# Patient Record
Sex: FEMALE | Race: WHITE | NOT HISPANIC OR LATINO | Employment: OTHER | URBAN - METROPOLITAN AREA
[De-identification: names, ages, dates, MRNs, and addresses within clinical notes are randomized per-mention and may not be internally consistent; named-entity substitution may affect disease eponyms.]

---

## 2023-02-21 LAB
ERYTHROCYTE DISTRIBUTION WIDTH (RATIO) BY AUTOMATED COUNT: 14.5 % (ref 11.5–14.5)
ERYTHROCYTE MEAN CORPUSCULAR HEMOGLOBIN CONCENTRATION (G/DL) BY AUTOMATED: 31.1 G/DL (ref 32–36)
ERYTHROCYTE MEAN CORPUSCULAR VOLUME (FL) BY AUTOMATED COUNT: 88 FL (ref 80–100)
ERYTHROCYTES (10*6/UL) IN BLOOD BY AUTOMATED COUNT: 4.24 X10E12/L (ref 4–5.2)
HEMATOCRIT (%) IN BLOOD BY AUTOMATED COUNT: 37.3 % (ref 36–46)
HEMOGLOBIN (G/DL) IN BLOOD: 11.6 G/DL (ref 12–16)
LEUKOCYTES (10*3/UL) IN BLOOD BY AUTOMATED COUNT: 9 X10E9/L (ref 4.4–11.3)
NRBC (PER 100 WBCS) BY AUTOMATED COUNT: 0 /100 WBC (ref 0–0)
PLATELETS (10*3/UL) IN BLOOD AUTOMATED COUNT: 367 X10E9/L (ref 150–450)

## 2023-02-28 ENCOUNTER — DOCUMENTATION (OUTPATIENT)
Dept: PRIMARY CARE | Facility: CLINIC | Age: 68
End: 2023-02-28
Payer: MEDICARE

## 2023-03-13 ENCOUNTER — TELEPHONE (OUTPATIENT)
Dept: PRIMARY CARE | Facility: CLINIC | Age: 68
End: 2023-03-13
Payer: MEDICARE

## 2023-03-13 NOTE — TELEPHONE ENCOUNTER
Pt called she was admitted last week to the [facility] for severe dehydration and released after 2 days.  She was told to get a kidney function test this week.   They did not give her an order for the lab and told her to follow up with her PCP. She has an appoint with you on 3/28/23 but does not think this can wait this long.  She had a CT with contrast and had taken metformin.   Also she wanted me to pass along that her admitting dx was covid but that was not why she went to the hospital. She was dx with covid on 2/25 after foot surgery and had mild sx.  It was the doxycycline that gave her diarrhea and the ASA made her vomit that contributed to her dehydration.

## 2023-03-14 NOTE — TELEPHONE ENCOUNTER
FYI : I called the pt she will be here at 2:45 on Thursday and I notified Leslie to add her to the schedule.

## 2023-03-15 PROBLEM — I10 HYPERTENSION: Status: ACTIVE | Noted: 2023-03-15

## 2023-03-15 PROBLEM — N28.9 ABNORMAL RENAL FUNCTION: Status: ACTIVE | Noted: 2023-03-15

## 2023-03-15 PROBLEM — S82.62XA: Status: ACTIVE | Noted: 2023-03-15

## 2023-03-15 PROBLEM — N81.10 FEMALE BLADDER PROLAPSE: Status: ACTIVE | Noted: 2023-03-15

## 2023-03-15 PROBLEM — L30.9 DERMATITIS: Status: ACTIVE | Noted: 2023-03-15

## 2023-03-15 PROBLEM — L98.9 SKIN ABNORMALITY: Status: ACTIVE | Noted: 2023-03-15

## 2023-03-15 PROBLEM — M21.372 LEFT FOOT DROP: Status: ACTIVE | Noted: 2023-03-15

## 2023-03-15 PROBLEM — E55.9 VITAMIN D DEFICIENCY: Status: ACTIVE | Noted: 2023-03-15

## 2023-03-15 PROBLEM — E11.40 TYPE 2 DIABETES MELLITUS WITH DIABETIC NEUROPATHY (MULTI): Status: ACTIVE | Noted: 2023-03-15

## 2023-03-15 PROBLEM — E78.5 HYPERLIPIDEMIA ASSOCIATED WITH TYPE 2 DIABETES MELLITUS (MULTI): Status: ACTIVE | Noted: 2023-03-15

## 2023-03-15 PROBLEM — R94.8 ABNORMAL RADIONUCLIDE BONE SCAN: Status: ACTIVE | Noted: 2023-03-15

## 2023-03-15 PROBLEM — N81.4 PROLAPSED UTERUS: Status: ACTIVE | Noted: 2023-03-15

## 2023-03-15 PROBLEM — E66.3 OVERWEIGHT WITH BODY MASS INDEX (BMI) OF 29 TO 29.9 IN ADULT: Status: ACTIVE | Noted: 2023-03-15

## 2023-03-15 PROBLEM — L40.9 PSORIASIS: Status: ACTIVE | Noted: 2023-03-15

## 2023-03-15 PROBLEM — G89.29 CHRONIC PAIN: Status: ACTIVE | Noted: 2023-03-15

## 2023-03-15 PROBLEM — E66.3 OVERWEIGHT WITH BODY MASS INDEX (BMI) OF 28 TO 28.9 IN ADULT: Status: ACTIVE | Noted: 2023-03-15

## 2023-03-15 PROBLEM — E11.69 HYPERLIPIDEMIA ASSOCIATED WITH TYPE 2 DIABETES MELLITUS (MULTI): Status: ACTIVE | Noted: 2023-03-15

## 2023-03-15 PROBLEM — R12 HEARTBURN: Status: ACTIVE | Noted: 2023-03-15

## 2023-03-15 PROBLEM — S84.12XA LEFT PERONEAL NERVE INJURY: Status: ACTIVE | Noted: 2023-03-15

## 2023-03-15 PROBLEM — D64.9 ANEMIA: Status: ACTIVE | Noted: 2023-03-15

## 2023-03-15 PROBLEM — R01.1 SYSTOLIC MURMUR: Status: ACTIVE | Noted: 2023-03-15

## 2023-03-15 PROBLEM — R05.9 COUGH: Status: ACTIVE | Noted: 2023-03-15

## 2023-03-15 PROBLEM — K22.70 BARRETT'S ESOPHAGUS: Status: ACTIVE | Noted: 2023-03-15

## 2023-03-15 RX ORDER — GABAPENTIN 100 MG/1
CAPSULE ORAL
COMMUNITY
Start: 2022-03-08 | End: 2023-03-16 | Stop reason: ALTCHOICE

## 2023-03-15 RX ORDER — CALCIPOTRIENE 0.05 MG/ML
SOLUTION TOPICAL
COMMUNITY
Start: 2020-11-09

## 2023-03-15 RX ORDER — VALSARTAN 320 MG/1
1 TABLET ORAL
COMMUNITY
Start: 2022-01-05 | End: 2024-02-06 | Stop reason: SDUPTHER

## 2023-03-15 RX ORDER — FLUOCINOLONE ACETONIDE 0.11 MG/ML
OIL TOPICAL
COMMUNITY
Start: 2022-08-12

## 2023-03-15 RX ORDER — HYDROCHLOROTHIAZIDE 25 MG/1
1 TABLET ORAL
COMMUNITY
Start: 2020-01-28 | End: 2023-03-28 | Stop reason: DRUGHIGH

## 2023-03-15 RX ORDER — INSULIN ASPART 100 [IU]/ML
45 INJECTION, SOLUTION INTRAVENOUS; SUBCUTANEOUS
COMMUNITY
Start: 2016-06-13

## 2023-03-15 RX ORDER — CLOBETASOL PROPIONATE 0.5 MG/ML
LOTION TOPICAL
COMMUNITY
Start: 2014-01-29

## 2023-03-15 RX ORDER — SITAGLIPTIN AND METFORMIN HYDROCHLORIDE 1000; 50 MG/1; MG/1
1 TABLET, FILM COATED ORAL
COMMUNITY
Start: 2019-08-07 | End: 2024-05-22 | Stop reason: SDUPTHER

## 2023-03-15 RX ORDER — ATORVASTATIN CALCIUM 20 MG/1
1 TABLET, FILM COATED ORAL
COMMUNITY
Start: 2019-04-25 | End: 2024-02-06 | Stop reason: SDUPTHER

## 2023-03-15 RX ORDER — BLOOD SUGAR DIAGNOSTIC
STRIP MISCELLANEOUS
COMMUNITY
Start: 2022-01-13 | End: 2023-10-09 | Stop reason: SDUPTHER

## 2023-03-15 RX ORDER — MULTIVITAMIN
1 TABLET ORAL DAILY
COMMUNITY
Start: 2021-02-09

## 2023-03-15 RX ORDER — INSULIN DEGLUDEC 100 U/ML
54 INJECTION, SOLUTION SUBCUTANEOUS NIGHTLY
COMMUNITY
Start: 2019-09-10

## 2023-03-15 RX ORDER — FLUOCINONIDE 0.5 MG/G
CREAM TOPICAL
COMMUNITY
Start: 2022-01-13 | End: 2023-10-09 | Stop reason: ALTCHOICE

## 2023-03-15 RX ORDER — ACETAMINOPHEN 160 MG/5ML
200 SUSPENSION, ORAL (FINAL DOSE FORM) ORAL
COMMUNITY

## 2023-03-15 RX ORDER — ASPIRIN 81 MG/1
1 TABLET ORAL
COMMUNITY
End: 2023-06-05 | Stop reason: SDUPTHER

## 2023-03-15 RX ORDER — PANTOPRAZOLE SODIUM 20 MG/1
40 TABLET, DELAYED RELEASE ORAL
COMMUNITY
Start: 2016-08-26 | End: 2023-08-07 | Stop reason: SDUPTHER

## 2023-03-15 RX ORDER — METOPROLOL SUCCINATE 50 MG/1
1 TABLET, EXTENDED RELEASE ORAL
COMMUNITY
Start: 2017-08-28 | End: 2023-06-05 | Stop reason: SDUPTHER

## 2023-03-15 RX ORDER — PEN NEEDLE, DIABETIC 31 GX5/16"
NEEDLE, DISPOSABLE MISCELLANEOUS
COMMUNITY
Start: 2023-01-12

## 2023-03-15 RX ORDER — VITAMIN B COMPLEX
1 CAPSULE ORAL
COMMUNITY
End: 2024-05-22 | Stop reason: WASHOUT

## 2023-03-16 ENCOUNTER — OFFICE VISIT (OUTPATIENT)
Dept: PRIMARY CARE | Facility: CLINIC | Age: 68
End: 2023-03-16
Payer: MEDICARE

## 2023-03-16 ENCOUNTER — LAB (OUTPATIENT)
Dept: LAB | Facility: LAB | Age: 68
End: 2023-03-16
Payer: MEDICARE

## 2023-03-16 VITALS — DIASTOLIC BLOOD PRESSURE: 62 MMHG | HEART RATE: 72 BPM | SYSTOLIC BLOOD PRESSURE: 162 MMHG

## 2023-03-16 DIAGNOSIS — Z09 HOSPITAL DISCHARGE FOLLOW-UP: Primary | ICD-10-CM

## 2023-03-16 DIAGNOSIS — D64.9 ANEMIA, UNSPECIFIED TYPE: ICD-10-CM

## 2023-03-16 DIAGNOSIS — R19.7 NAUSEA VOMITING AND DIARRHEA: ICD-10-CM

## 2023-03-16 DIAGNOSIS — U07.1 COVID-19 VIRUS INFECTION: ICD-10-CM

## 2023-03-16 DIAGNOSIS — R23.8 REDNESS AND SWELLING OF UPPER ARM: ICD-10-CM

## 2023-03-16 DIAGNOSIS — N17.9 ACUTE RENAL FAILURE, UNSPECIFIED ACUTE RENAL FAILURE TYPE (CMS-HCC): ICD-10-CM

## 2023-03-16 DIAGNOSIS — E86.0 DEHYDRATION: ICD-10-CM

## 2023-03-16 DIAGNOSIS — R11.2 NAUSEA VOMITING AND DIARRHEA: ICD-10-CM

## 2023-03-16 DIAGNOSIS — R91.8 BILATERAL PULMONARY INFILTRATES ON CHEST X-RAY: ICD-10-CM

## 2023-03-16 DIAGNOSIS — R79.89 ELEVATED D-DIMER: ICD-10-CM

## 2023-03-16 DIAGNOSIS — M79.89 REDNESS AND SWELLING OF UPPER ARM: ICD-10-CM

## 2023-03-16 DIAGNOSIS — L03.114 CELLULITIS OF LEFT ARM: ICD-10-CM

## 2023-03-16 DIAGNOSIS — J12.9 VIRAL PNEUMONITIS: ICD-10-CM

## 2023-03-16 PROCEDURE — 85025 COMPLETE CBC W/AUTO DIFF WBC: CPT

## 2023-03-16 PROCEDURE — 99496 TRANSJ CARE MGMT HIGH F2F 7D: CPT | Performed by: INTERNAL MEDICINE

## 2023-03-16 PROCEDURE — 3077F SYST BP >= 140 MM HG: CPT | Performed by: INTERNAL MEDICINE

## 2023-03-16 PROCEDURE — 3078F DIAST BP <80 MM HG: CPT | Performed by: INTERNAL MEDICINE

## 2023-03-16 PROCEDURE — 1160F RVW MEDS BY RX/DR IN RCRD: CPT | Performed by: INTERNAL MEDICINE

## 2023-03-16 PROCEDURE — 1036F TOBACCO NON-USER: CPT | Performed by: INTERNAL MEDICINE

## 2023-03-16 PROCEDURE — 4010F ACE/ARB THERAPY RXD/TAKEN: CPT | Performed by: INTERNAL MEDICINE

## 2023-03-16 PROCEDURE — 1159F MED LIST DOCD IN RCRD: CPT | Performed by: INTERNAL MEDICINE

## 2023-03-16 PROCEDURE — 36415 COLL VENOUS BLD VENIPUNCTURE: CPT

## 2023-03-16 PROCEDURE — 80053 COMPREHEN METABOLIC PANEL: CPT

## 2023-03-16 RX ORDER — ASPIRIN 81 MG/1
81 TABLET ORAL ONCE
Status: DISCONTINUED | OUTPATIENT
Start: 2023-03-16 | End: 2023-03-16

## 2023-03-16 RX ORDER — CEPHALEXIN 500 MG/1
CAPSULE ORAL
Qty: 21 CAPSULE | Refills: 0 | Status: SHIPPED | OUTPATIENT
Start: 2023-03-16 | End: 2023-03-28 | Stop reason: ALTCHOICE

## 2023-03-16 RX ORDER — LACTOBACILLUS RHAMNOSUS GG 10B CELL
1 CAPSULE ORAL DAILY
Qty: 15 CAPSULE | Refills: 0
Start: 2023-03-16 | End: 2024-05-22 | Stop reason: ALTCHOICE

## 2023-03-16 RX ORDER — ASPIRIN 81 MG/1
TABLET ORAL
Qty: 30 TABLET | Refills: 11
Start: 2023-03-16 | End: 2023-06-05 | Stop reason: SDUPTHER

## 2023-03-16 ASSESSMENT — COLUMBIA-SUICIDE SEVERITY RATING SCALE - C-SSRS
2. HAVE YOU ACTUALLY HAD ANY THOUGHTS OF KILLING YOURSELF?: NO
1. IN THE PAST MONTH, HAVE YOU WISHED YOU WERE DEAD OR WISHED YOU COULD GO TO SLEEP AND NOT WAKE UP?: NO
6. HAVE YOU EVER DONE ANYTHING, STARTED TO DO ANYTHING, OR PREPARED TO DO ANYTHING TO END YOUR LIFE?: NO

## 2023-03-16 ASSESSMENT — PAIN SCALES - GENERAL: PAINLEVEL: 0-NO PAIN

## 2023-03-16 ASSESSMENT — ENCOUNTER SYMPTOMS
DEPRESSION: 0
OCCASIONAL FEELINGS OF UNSTEADINESS: 0
LOSS OF SENSATION IN FEET: 1

## 2023-03-16 ASSESSMENT — PATIENT HEALTH QUESTIONNAIRE - PHQ9
1. LITTLE INTEREST OR PLEASURE IN DOING THINGS: NOT AT ALL
SUM OF ALL RESPONSES TO PHQ9 QUESTIONS 1 AND 2: 0
2. FEELING DOWN, DEPRESSED OR HOPELESS: NOT AT ALL

## 2023-03-16 NOTE — PROGRESS NOTES
Patient: Juanis Arboleda  : 1955  PCP: Megan Domingo MD  MRN: [id number]  Program: No linked episodes     Juanis Arboleda is a 67 y.o. female presenting today for follow-up after being discharged from the hospital 6 days ago. The main problem requiring admission was nausea vomiting diarrhea dehydration and acute kidney injury her creatinine was 1.62 when she arrived at the hospital.  She had refused to go to the hospital, she is accompanied by her daughter who is a nurse today and said she finally insisted when she was slumped over on the toilet and not acting right and she called 911.  She was treated with IV fluids.  She had a very high D-dimer over 4000 per the family, she had an anemia, preop her hemoglobin was 11.4 and that anemia is being worked up and she is to follow-up with GI, this time with the dehydration her hemoglobin was 10.4 and 10.2 at discharge.  They did a CT PE protocol despite the elevated creatinine, and this did not show a PE she had ultrasounds of her legs that did not show a PE, but she did have bilateral infiltrates groundglass opacities consistent with a viral pneumonia and her COVID-positive status.  She felt that the nausea vomiting diarrhea was actually from the doxycycline and she stopped it at the 6-day of treatment.  That was given to her for her ankle surgery that she had on .  That surgery went well, left lower leg.  She is recovering nicely from that.    Her creatinine at discharge was 1.0.  She has held her Janumet valsartan and hydrochlorothiazide per instructions from the hospital.  She also had been given, by podiatry, and increase in her usual 81 mg daily aspirin dose, 2 325 mg twice daily and that was stopped as well because of the nausea vomiting diarrhea and anemia and she is back on her 81 mg once per day enteric-coated.    She has no complaints today she feels good.  She then amended that and said she did have some nasal congestion postnasal  "drainage which caused a little bit of a cough but she did not consider those major symptoms and she feels well.  See notes below.. The discharge summary and/or Transitional Care Management documentation was reviewed. Medication reconciliation was performed as indicated via the \"Mian as Reviewed\" timestamp.     Covid positive but this was day after surgery.  Had a bad valente.  was pos few days before the surgery.  Neg the day before and pos the day after.  Put on odxy 100mg bid twice per day.  Day 5 or 6 gi upsed. And also 325mg asa twice per day.  N/v/d no blood.  No abd pain.  Was not eating.  Sugars were ok. Not low.  Dtr convinced her to go to the hospital. She would not go.  She was out of it and dtr called the squad slumped over ont hetoilet.    Since home no nvd.   Taking bid pantoprazoles instad of one  Appetite back when home. Never lost taste or smell but  did.  Now has a cough and had a little plhegm before, clear.blowing nose a lot  No sob ever  No wheezing  No cp no palpitations.  No leg swelling.  Ct chest infiltrate. Bialteral more prominin on the lef  Pcn allergy and     Yesterday noted painful swelling left arm above and below elbow --did have iv in atnecubital area and she was bending a lot.they never took it out.  It was not like this when she left hospital  No fever chills.      She is waiting to take the valsartan hydrochlorothiazide and janumet until we tell ehr.  Juanis L Robles was contacted by Transitional Care Management services two days after her discharge. This encounter and supporting documentation was reviewed.    The complexity of medical decision making for this patient's transitional care is high.    Review of Systems  See above  Family History   Problem Relation Name Age of Onset    Hypertension Mother      Diabetes type II Mother      Other (cholecystectomy) Mother      Lung disease Mother          nonsmoking COPD and asthma had second hand smoke exposure    Diabetes " type II Father      Diabetes type II Sister      Other (cholecystectomy) Sister      Diabetes type II Brother      Other (cholecystectomy) Daughter      Other (bicuspid aortic valve) Grandson      Other (cardiac disorder) Other      Graves' disease Other      Hypertension Other      Diabetes type II Other     Patient looks well and is in no obvious distress.  HEENT:   Normocephalic, no facial asymmetry  Eyes: Sclera and conjunctiva are clear.  Neck: No adenopathy cervical (Ant/post/lat), no Supraclavicular nodes.   Thyroid normal.   Carotid pulses normal, no carotid bruits.  Lungs : RR normal. Clear to auscultation anterior, posterior and lateral. No rales, wheezes rhonchi or rubs. Good air exchange.  Heart: RRR. 1-2/6 systolic Murmur ursb , No gallop, click or rub.  Abdomen: Bowel sounds normal, No bruits. No pulsatile mass. No hepatosplenomegaly, masses or tenderness. Soft, no guarding.  Vascular:  Posterior tibialis and dorsalis pedis pulses within normal limits bilaterally.   Extremities: no upper extremity edema. No lower extremity edema.   Musculoskeletal: no synovitis of joints seen. No new deformity.  Neuro: CN 2-12 intact. Alert, appropriate.   Ambulates independently.  No gross motor deficit.   No tremors.  Psych: normal mood and affect.  Skin: No rash, bruising petechiae or jaundice.    Lower leg is in a boot and she is here with a wheelchair.  Negative Homans no calf tenderness on palpation and no swelling of the upper part of the lower leg visible on the left and any part of the right lower leg.  Her left arm though shows erythema warmth and swelling starting just inferior to the elbow and extending up about 4 to 5 inches proximal to the elbow it swollen over the medial olecranon in the soft tissues and mildly tender.  She also has a dilated vein extending from just below the elbow and acutely to two thirds of the way up the left upper arm.  She did have an antecubital IV when in the hospital.  Rule out  "cellulitis, rule out DVT of the left upper extremity.  Her risk for this is she had an IV, she is diabetic, she is postop from ankle surgery and she had COVID with elevated D-dimer.  We will get a stat ultrasound of this.  We will recheck her labs for the others.    For her blood pressure, she will restart her valsartan but hold her hydrochlorothiazide.  She will continue to hold Janumet.    Diabetes her sugars are doing fairly well she just checked it while she was here and it was 119.  She has not had any low sugars.  She said she could not give me her data from her Dexcom because she did not have her phone to provide it for me but she thought they were \"okay\".  She has not had any low sugars.  We will do a close follow-up and she will come back in 1 to 2 weeks      No data recorded  Labs reviewed on ccf dr connect/  Imaging reviewed here.  .laABNORMAL) CBC (03/09/2023 6:09 AM EST)  Lab Results - (ABNORMAL) CBC (03/09/2023 6:09 AM EST)  Component Value Ref Range Test Method Analysis Time Performed At Pathologist Signature   WBC 5.33 3.70 - 11.00 k/uL   03/09/2023 6:28 AM EST [facility]     RBC 3.82 (L) 3.90 - 5.20 m/uL   03/09/2023 6:28 AM EST [facility]     Hemoglobin 10.2 (L) 11.5 - 15.5 g/dL   03/09/2023 6:28 AM EST [facility]     Hematocrit 31.8 (L) 36.0 - 46.0 %   03/09/2023 6:28 AM EST [facility]     MCV 83.2 80.0 - 100.0 fL   03/09/2023 6:28 AM EST [facility]     MCH 26.7 26.0 - 34.0 pg   03/09/2023 6:28 AM EST [facility]     MCHC 32.1 30.5 - 36.0 g/dL   03/09/2023 6:28 AM EST [facility]     RDW-CV 14.6 11.5 - 15.0 %   03/09/2023 6:28 AM EST [facility]     Platelet Count 441 (H) 150 - 400 k/uL   03/09/2023 6:28 AM EST [facility]     MPV 9.6 9.0 - 12.7 fL   03/09/2023 6:28 AM EST [facility]     Absolute nRBC <0.01 <0.01 k/uL   03/09/2023 6:28 AM EST [facility]      BUN 12 7 - 21 mg/dL   03/09/2023 6:44 AM [facility]     Creatinine " 1.00 (H) 0.58 - 0.96 mg/dL   03/09/2023 6:44 AM EST [facility]     Sodium 136 136 - 144 mmol/L   03/09/2023 6:44 AM EST [facility]     Potassium 5.3 (H) 3.7 - 5.1 mmol/L   03/09/2023 6:44 AM EST [facility]     Chloride 104 97 - 105 mmol/L   03/09/2023 6:44 AM EST [facility]     CO2 25 22 - 30 mmol/L   03/09/2023 6:44 AM EST [facility]     Anion Gap 7 (L) 9 - 18 mmol/L   03/09/2023 6:44 AM EST [facility]     Calcium, Total 8.8 8.5 - 10.2 mg/dL   03/09/2023 6:44 AM EST [facility]     Estimated Glomerular Filtration Rate 61 >=60 mL/min/1.73m²   03/09/2023 6:44 AM EST [facility]    ABNORMAL) URINE CULTURE (03/08/2023 1:40 PM EST)  Lab Results - (ABNORMAL) URINE CULTURE (03/08/2023 1:40 PM EST)  Component Value Ref Range Test Method Analysis Time Performed At Pathologist [facility]   Culture, Urine 10,000 -<50,000 CFU/ml Enterococcus faecalis (A)   MINIMUM INHIBITORY CONCENTRATION(VITEK)  03/10/2023 8:41 AM EST [facility]     Comment: Cephalosporins, clindamycin, and TMP-SMX are not effective for the treatment of enterococcal infections.     ABNORMAL) D-DIMER (03/07/2023 11:01 AM EST)  Lab Results - (ABNORMAL) D-DIMER (03/07/2023 11:01 AM EST)  Component Value Ref Range Test Method Analysis Time Performed At Pathologist [facility]   D Dimer 4,190 (H) <500 ng/mL FEU   03/07/2023 11:45 AM EST [facility]     D Dimer Age-related Cutoff 690 ng/mL FEU   03/07/2023 11:45 AM EST [facility]       Protein, Total 7.1 6.3 - 8.0 g/dL   03/07/2023 11:49 AM EST [facility]     Albumin 3.9 3.9 - 4.9 g/dL   03/07/2023 11:49 AM EST [facility]     Calcium, Total 9.5 8.5 - 10.2 mg/dL   03/07/2023 11:49 AM [facility]     Bilirubin, Total 1.0 0.2 - 1.3 mg/dL   03/07/2023 11:49 AM [facility]     Alkaline Phosphatase 76 34 - 123 U/L   03/07/2023 11:49 AM [facility]     AST 32 13 - 35 U/L   03/07/2023 11:49 AM [facility]     ALT 30 7 - 38 U/L   03/07/2023 11:49 AM EST [facility]     Glucose 232 (H) 74 - 99 mg/dL   03/07/2023 11:49 AM EST [facility]     Comment:  The American Diabetes Association (ADA) provides guidance for cutoff values for fasting glucose and random glucose. The ADA defines fasting as no caloric intake for at least 8 hours. Fasting plasma glucose results between 100 to 125 mg/dL indicate increased risk for diabetes (prediabetes).  Fasting plasma glucose results greater than or equal to 126 mg/dL meet the criteria for diagnosis of diabetes. In the absence of unequivocal hyperglycemia, results should be confirmed by repeat testing. In a patient with classic symptoms of hyperglycemia or hyperglycemic crisis, random plasma glucose results greater than or equal to 200 mg/dL meet the criteria for diagnosis of diabetes.  Reference: Standards of Medical Care in Diabetes 2016, American Diabetes Association. Diabetes Care. 2016.39(Suppl 1).     BUN 35 (H) 7 - 21 mg/dL   03/07/2023 11:49 AM [facility]     Creatinine 1.62 (H) 0.58 - 0.96 mg/dL   03/07/2023 11:49 AM [facility]     Sodium 131 (L) 136 - 144 mmol/L   03/07/2023 11:49 AM EST [facility]     Potassium 5.1 3.7 - 5.1 mmol/L   03/07/2023 11:49 AM [facility]     Chloride 94 (L) 97 - 105 mmol/L   03/07/2023 11:49 AM EST [facility]     CO2 22 22 - 30 mmol/L   03/07/2023 11:49 AM EST [facility]     Anion Gap 15 9 - 18 mmol/L   03/07/2023 11:49 AM EST [facility]    Ser rate 41 and crp 2.0    Follow up 3-28.

## 2023-03-16 NOTE — PATIENT INSTRUCTIONS
Blood test today.  Ultrasound veins left arm today or in am to be sure no clot.  Cephalexin 500 mg three times per day about 8 hours apart, take with food, for 7 days.  Probiotic or greek yogurt daily. (Align or culturelle).  Aspirin 2 tabs of 81mg twice per day with food for the time being.  Restart valsartan but not hydrochlorothiazide and not janumet.  Use insulin to control your sugars for now.  See me in 10-14 days.         Ways to Help Prevent Falls at Home    Quick Tips   ? Ask for help if you need it. Most people want to help!   ? Get up slowly after sitting or laying down   ? Wear a medical alert device or keep cell phone in your pocket   ? Use night lights, especially areas near a bathroom   ? Keep the items you use often within reach on a small stool or end table   ? Use an assistive device such as walker or cane, as directed by provider/physical therapy   ? Use a non-slip mat and grab bars in your bathroom. Look for home health sections for best options     Other Areas to Focus On   ? Exercise and nutrition: Regular exercise or taking a falls prevention class are great ways improve strength and balance. Don’t forget to stay hydrated and bring a snack!   ? Medicine side effects: Some medicines can make you sleepy or dizzy, which could cause a fall. Ask your healthcare provider about the side effects your medicines could cause. Be sure to let them know if you take any vitamins or supplements as well.   ? Tripping hazards: Remove items you could trip on, such as loose mats, rugs, cords, and clutter. Wear closed toe shoes with rubber soles.   ? Health and wellness: Get regular checkups with your healthcare provider, plus routine vision and hearing screenings. Talk with your healthcare provider about:   o Your medicines and the possible side effects - bring them in a bag if that is easier!   o Problems with balance or feeling dizzy   o Ways to promote bone health, such as Vitamin D and calcium supplements   o  Questions or concerns about falling     *Ask your healthcare team if you have questions     ©[employer], 2022

## 2023-03-17 ENCOUNTER — TELEPHONE (OUTPATIENT)
Dept: PRIMARY CARE | Facility: CLINIC | Age: 68
End: 2023-03-17
Payer: MEDICARE

## 2023-03-17 LAB
ALANINE AMINOTRANSFERASE (SGPT) (U/L) IN SER/PLAS: 32 U/L (ref 7–45)
ALBUMIN (G/DL) IN SER/PLAS: 3.9 G/DL (ref 3.4–5)
ALKALINE PHOSPHATASE (U/L) IN SER/PLAS: 67 U/L (ref 33–136)
ANION GAP IN SER/PLAS: 14 MMOL/L (ref 10–20)
ASPARTATE AMINOTRANSFERASE (SGOT) (U/L) IN SER/PLAS: 26 U/L (ref 9–39)
BASOPHILS (10*3/UL) IN BLOOD BY AUTOMATED COUNT: 0.05 X10E9/L (ref 0–0.1)
BASOPHILS/100 LEUKOCYTES IN BLOOD BY AUTOMATED COUNT: 0.5 % (ref 0–2)
BILIRUBIN TOTAL (MG/DL) IN SER/PLAS: 0.8 MG/DL (ref 0–1.2)
CALCIUM (MG/DL) IN SER/PLAS: 10.5 MG/DL (ref 8.6–10.6)
CARBON DIOXIDE, TOTAL (MMOL/L) IN SER/PLAS: 28 MMOL/L (ref 21–32)
CHLORIDE (MMOL/L) IN SER/PLAS: 104 MMOL/L (ref 98–107)
CREATININE (MG/DL) IN SER/PLAS: 0.98 MG/DL (ref 0.5–1.05)
EOSINOPHILS (10*3/UL) IN BLOOD BY AUTOMATED COUNT: 0.1 X10E9/L (ref 0–0.7)
EOSINOPHILS/100 LEUKOCYTES IN BLOOD BY AUTOMATED COUNT: 1 % (ref 0–6)
ERYTHROCYTE DISTRIBUTION WIDTH (RATIO) BY AUTOMATED COUNT: 14.4 % (ref 11.5–14.5)
ERYTHROCYTE MEAN CORPUSCULAR HEMOGLOBIN CONCENTRATION (G/DL) BY AUTOMATED: 31.7 G/DL (ref 32–36)
ERYTHROCYTE MEAN CORPUSCULAR VOLUME (FL) BY AUTOMATED COUNT: 86 FL (ref 80–100)
ERYTHROCYTES (10*6/UL) IN BLOOD BY AUTOMATED COUNT: 4.15 X10E12/L (ref 4–5.2)
GFR FEMALE: 62 ML/MIN/1.73M2
GLUCOSE (MG/DL) IN SER/PLAS: 99 MG/DL (ref 74–99)
HEMATOCRIT (%) IN BLOOD BY AUTOMATED COUNT: 35.7 % (ref 36–46)
HEMOGLOBIN (G/DL) IN BLOOD: 11.3 G/DL (ref 12–16)
IMMATURE GRANULOCYTES/100 LEUKOCYTES IN BLOOD BY AUTOMATED COUNT: 0.6 % (ref 0–0.9)
LEUKOCYTES (10*3/UL) IN BLOOD BY AUTOMATED COUNT: 10.3 X10E9/L (ref 4.4–11.3)
LYMPHOCYTES (10*3/UL) IN BLOOD BY AUTOMATED COUNT: 3.09 X10E9/L (ref 1.2–4.8)
LYMPHOCYTES/100 LEUKOCYTES IN BLOOD BY AUTOMATED COUNT: 30.1 % (ref 13–44)
MONOCYTES (10*3/UL) IN BLOOD BY AUTOMATED COUNT: 0.8 X10E9/L (ref 0.1–1)
MONOCYTES/100 LEUKOCYTES IN BLOOD BY AUTOMATED COUNT: 7.8 % (ref 2–10)
NEUTROPHILS (10*3/UL) IN BLOOD BY AUTOMATED COUNT: 6.16 X10E9/L (ref 1.2–7.7)
NEUTROPHILS/100 LEUKOCYTES IN BLOOD BY AUTOMATED COUNT: 60 % (ref 40–80)
NRBC (PER 100 WBCS) BY AUTOMATED COUNT: 0 /100 WBC (ref 0–0)
PLATELETS (10*3/UL) IN BLOOD AUTOMATED COUNT: 576 X10E9/L (ref 150–450)
POTASSIUM (MMOL/L) IN SER/PLAS: 4.9 MMOL/L (ref 3.5–5.3)
PROTEIN TOTAL: 6.8 G/DL (ref 6.4–8.2)
SODIUM (MMOL/L) IN SER/PLAS: 141 MMOL/L (ref 136–145)
UREA NITROGEN (MG/DL) IN SER/PLAS: 15 MG/DL (ref 6–23)

## 2023-03-17 NOTE — RESULT ENCOUNTER NOTE
Please call the patient regarding her abnormal result.for argelia or [place]:  Please call Juanis today about her left arm ultrasound results.  Good news that she does not have a deep vein thrombosis, but she does have a superficial phlebitis.  I recommend that she continue the 2 baby aspirin twice per day for 2 weeks.  She should use warm compresses on this area about 20 to 30 minutes 3 times a day.  She should take the antibiotic that we prescribed.  Her kidney function is back to normal.  Her anemia is mild and similar to what it was before she was in the hospital.  She should restart the valsartan as we discussed.  I would still like her to hold off on the hydrochlorothiazide until I see her in the office.  She should call if her arm above the elbow becomes swollen or more swollen.

## 2023-03-20 RX ORDER — GLUCOSAMINE HCL 500 MG
TABLET ORAL
COMMUNITY

## 2023-03-20 RX ORDER — INSULIN ASPART 100 [IU]/ML
14 INJECTION, SOLUTION INTRAVENOUS; SUBCUTANEOUS
COMMUNITY
End: 2023-10-09 | Stop reason: ALTCHOICE

## 2023-03-27 NOTE — PROGRESS NOTES
Subjective   Patient ID: Juanis Arboleda is a 67 y.o. female who presents for Follow-up.  LAST VISIT was hospital follow upmarch 2023  Plan was:Blood test today.  Ultrasound veins left arm today or in am to be sure no clot.  Cephalexin 500 mg three times per day about 8 hours apart, take with food, for 7 days.  Probiotic or greek yogurt daily. (Align or culturelle).  Aspirin 2 tabs of 81mg twice per day with food for the time being.  Restart valsartan but not hydrochlorothiazide and not janumet.  Use insulin to control your sugars for now.  See me in 10-14 days.   Last regular visit was  1/30/23 and plan was.  Patient Discussion/Summary  MEDICATIONS:   no change at this time, ok for b complex you are taking.  if iron is low we may have you start taking it but not until after stool studies are done  INSTRUCTIONS:  You are encouraged to drink 48-64 oz of water per day. 1 or 2 cups of herbal tea could be counted toward the water intake.    A diet that is low in sugars, refined grains and processed foods is recommended.  Exercise at least 30 minutes per day is also recommended.  follow dr sanchez instructions for insulin and holding aspirin with your upcoming surgery.  TESTING:stool test for FIT and stool for occult blood, blood test to check iron today.  blood test to check on anemia in 3 weeks, couple days before your foot surgery with dr javier..    Referrals: make an appt with Dr Sonny CORREA about anemia but also you are due to see him for past history barretts.  FOLLOW UP WITH DR. MONROE:  Next visit:3-4 months as long as you can get in to see Dr Dennis before then.  Provider Impressions  anemia, see hpi, ck stool for blood, refer back to her gi as due anyways, ck iron folate(b12 is ok). is borderline. will cici cbc few days before her foot surgery to be sure not dropping. has no c/o  Type 2 DM with hyperlipidemia-labs ok, refilled statin  HTN well controlled at home, and a little better here than usual  Systolic  murmur d/w her-stable exam, echo did not show any abnormality 2021-i was first to tell her about urmur  Abnormal renal function--see above-creat 1.09, avoid nsaids, keep bp under 130/80-it is at home, hydrate.  Vitamin D deficiencylevel ok and prior inc calcium resolved on last 2 tests.  END INFO FROM January visit  HPI  Scribe Transcription:  She reports feeling better today. She states she is taking 4 baby dose aspirin for 2 weeks as well as Keflex. She also held her Janumet due to her kidney function. She is better but not quite back to normal with her blood tests. Her Cr is 0.98. Her last visit was a hospital f/u and her cr was 1.09 and then on March 16 it was 0.98. She still has anemia as well.     She denies abdominal issues, breathing issues, and chest pain.     She states she sees Dr. Ryan again on 5/25. We discussed having her call the office with updates on her bp after she checks it at home tomorrow.     Cardiac: No CP, palpitations, increased jacobo  Resp: No sob, wheezing, cough  Extremities: No leg or ankle swelling, no claudication  Neuro-No dizziness, syncope, blurred vision. No new headaches.   She reports eating villafana and corned beef this morning and lunch before getting her bp done today.     Diabetes:Type II:Is taking medications regularly, denies side effects.  Denies hypoglycemia, polyuria, polydipsia.  No new numbness or paresthesias of extremities.No syncope or dizziness.No blurred vision.  Lab Results   Component Value Date    HGBA1C 8.0 05/06/2020     Lab Results   Component Value Date    CREATININE 0.98 03/16/2023         Lab Results   Component Value Date    GLUCOSE 99 03/16/2023    CALCIUM 10.5 03/16/2023     03/16/2023    K 4.9 03/16/2023    CO2 28 03/16/2023     03/16/2023    BUN 15 03/16/2023    CREATININE 0.98 03/16/2023      No results found for this or any previous visit (from the past 4464 hour(s)).       Diabetic Medications:   Insulin and janumet is on hold      She  denies any hypoglycemia and her sugars have been running in the 300s, likely due to stopping Janumet.     Her cough has resolved and her acute renal failure has resolved. She no longer has redness and swelling in the upper arm but it is  at her medial left elbow. She has no signs of infection and has finished her antibiotics. She did not have elevated plts on leaving the hospital, but does now. She did have an elevated d dimer on discharge. CT scan showed more disease on the left side as well and I think an xray is okay.     Review of Systems    Objective   Lab Results   Component Value Date    WBC 10.3 03/16/2023    HGB 11.3 (L) 03/16/2023    HCT 35.7 (L) 03/16/2023     (H) 03/16/2023    CHOL 171 09/20/2022    TRIG 219 (H) 09/20/2022    HDL 34.6 (A) 09/20/2022    ALT 32 03/16/2023    AST 26 03/16/2023     03/16/2023    K 4.9 03/16/2023     03/16/2023    CREATININE 0.98 03/16/2023    BUN 15 03/16/2023    CO2 28 03/16/2023    TSH 2.43 01/25/2023    HGBA1C 8.0 05/06/2020      Latest Reference Range & Units 01/25/23 13:43 01/30/23 10:46 03/16/23 16:25   GLUCOSE 74 - 99 mg/dL 133 (H)  99   SODIUM 136 - 145 mmol/L 141  141   POTASSIUM 3.5 - 5.3 mmol/L 4.8  4.9   CHLORIDE 98 - 107 mmol/L 102  104   Bicarbonate 21 - 32 mmol/L 29  28   Anion Gap 10 - 20 mmol/L 15  14   Blood Urea Nitrogen 6 - 23 mg/dL 22  15   Creatinine 0.50 - 1.05 mg/dL 1.09 (H)  0.98   Calcium 8.6 - 10.6 mg/dL 10.2  10.5   Albumin 3.4 - 5.0 g/dL 4.4  3.9   Alkaline Phosphatase 33 - 136 U/L 66  67   ALT 7 - 45 U/L 30  32   AST 9 - 39 U/L 24  26   Bilirubin Total 0.0 - 1.2 mg/dL 0.8  0.8   FERRITIN 8 - 150 ug/L  30    FOLATE >5.0 ng/mL  >24.0    Total Protein 6.4 - 8.2 g/dL 6.9  6.8   IRON 35 - 150 ug/dL  62    TIBC 240 - 445 ug/dL  >512 !    % Saturation 25 - 45 %  NOT CALC.    Vitamin B12 211 - 911 pg/mL 419     Thyroid Stimulating Hormone 0.44 - 3.98 mIU/L 2.43     Vitamin D, 25-Hydroxy, Total ng/mL 37     Vit D,  "1,25-Dihydroxy 19.9 - 79.3 pg/mL 39.2     (H): Data is abnormally high  !: Data is abnormal   Latest Reference Range & Units 01/25/23 13:43 02/20/23 14:12 03/16/23 16:25   WBC 4.4 - 11.3 x10E9/L 8.4 9.0 10.3   RBC 4.00 - 5.20 x10E12/L 4.48 4.24 4.15   HEMOGLOBIN 12.0 - 16.0 g/dL 11.9 (L) 11.6 (L) 11.3 (L)   HEMATOCRIT 36.0 - 46.0 % 39.0 37.3 35.7 (L)   MCV 80 - 100 fL 87 88 86   MCHC 32.0 - 36.0 g/dL 30.5 (L) 31.1 (L) 31.7 (L)   Platelets 150 - 450 x10E9/L 380 367 576 (H)   Neutrophils % 40.0 - 80.0 %   60.0   Immature Granulocytes %, Automated 0.0 - 0.9 %   0.6   Lymphocytes % 13.0 - 44.0 %   30.1   Monocytes % 2.0 - 10.0 %   7.8   Eosinophils % 0.0 - 6.0 %   1.0   Basophils % 0.0 - 2.0 %   0.5   Neutrophils Absolute 1.20 - 7.70 x10E9/L   6.16   Lymphocytes Absolute 1.20 - 4.80 x10E9/L   3.09   Monocytes Absolute 0.10 - 1.00 x10E9/L   0.80   Eosinophils Absolute 0.00 - 0.70 x10E9/L   0.10   Basophils Absolute 0.00 - 0.10 x10E9/L   0.05   nRBC 0.0 - 0.0 /100 WBC 0.0 0.0 0.0   RED CELL DISTRIBUTION WIDTH 11.5 - 14.5 % 14.6 (H) 14.5 14.4   (L): Data is abnormally low  (H): Data is abnormally highpar  Physical ExamBP 172/68 (Patient Position: Sitting, BP Cuff Size: Adult)   Pulse 70   Resp 18   Ht 1.6 m (5' 3\")   Wt 67.6 kg (149 lb)   BMI 26.39 kg/m²   Patient looks well and is in no obvious distress.  HEENT:   Normocephalic, no facial asymmetry  Eyes: Sclera and conjunctiva are clear.  Neck: No adenopathy cervical (Ant/post/lat), no Supraclavicular nodes.   Thyroid normal.   Carotid pulses normal, no carotid bruits.  Lungs : RR normal. Clear to auscultation anterior, posterior and lateral. No rales, wheezes rhonchi or rubs. Good air exchange.  Heart: RRR. No Murmur, gallop, click or rub.  Abdomen: Bowel sounds normal, No bruits. No pulsatile mass. No hepatosplenomegaly, masses or tenderness. Soft, no guarding.  Vascular:  Posterior tibialis and dorsalis pedis pulses within normal limits bilaterally. "   Extremities: no upper extremity edema. No lower extremity edema.   Musculoskeletal: no synovitis of joints seen. No new deformity.  Neuro: CN 2-12 intact. Alert, appropriate.   Ambulates independently.  No gross motor deficit.   No tremors.  Psych: normal mood and affect.  Skin: No rash, bruising petechiae or jaundice.          Assessment/Plan   Problem List Items Addressed This Visit          Nervous    Type 2 diabetes mellitus with diabetic neuropathy (CMS/HCC)       Respiratory    Viral pneumonitis    Relevant Orders    XR chest 2 views    Infiltrate of left lung present on chest x-ray    Relevant Orders    XR chest 2 views       Genitourinary    RESOLVED: Acute renal failure (CMS/HCC) - Primary    Relevant Orders    Basic metabolic panel       Musculoskeletal    Redness and swelling of upper arm-improved       Hematologic    Anemia improving, follow. Noactive bleeding    Relevant Orders    CBC and Auto Differential    Elevated d-dimer-common with covid, is at risk for vte of some type for 6 months after covid dx       Infectious/Inflammatory    COVID-19 virus infection    Relevant Orders    XR chest 2 views-if non diagnostic or cannot follow then ct chest.     Other Visit Diagnoses       Accelerated hypertension    follow upa nd ck bp at home. Has white coat htn.    Superficial thrombophlebitis of left upper extremity  improving but not completely resolved. No dvt. See plan      Thrombocytosis (CMS/HCC)    -follow-see plan about aspirin daily for time being but wean down to one 81mg tab per day.     See patient instructions for additional treatment plan.       one month follow up after viral pneumonitis covid and had left infiltrates on cxr at [facility] march 7 2023--ct chest showed more extensive illness, but she is feeling well so starting with follow up cxr.  Diabetes -restart janumet now that kidneys are better. Sugars are too high, she states in the past this happened when they stopped janumet thinking she di  dnot need it.    Htn, restart hydrochlorothiazide but with recent renal issues, at a lower dose. And follow labs.  See patient instructions for additional treatment plan.  Complexity of this patient is high.

## 2023-03-28 ENCOUNTER — OFFICE VISIT (OUTPATIENT)
Dept: PRIMARY CARE | Facility: CLINIC | Age: 68
End: 2023-03-28
Payer: MEDICARE

## 2023-03-28 VITALS
RESPIRATION RATE: 18 BRPM | SYSTOLIC BLOOD PRESSURE: 172 MMHG | BODY MASS INDEX: 26.4 KG/M2 | HEART RATE: 70 BPM | DIASTOLIC BLOOD PRESSURE: 68 MMHG | HEIGHT: 63 IN | WEIGHT: 149 LBS

## 2023-03-28 DIAGNOSIS — E11.40 TYPE 2 DIABETES MELLITUS WITH DIABETIC NEUROPATHY, WITH LONG-TERM CURRENT USE OF INSULIN (MULTI): ICD-10-CM

## 2023-03-28 DIAGNOSIS — M79.89 REDNESS AND SWELLING OF UPPER ARM: ICD-10-CM

## 2023-03-28 DIAGNOSIS — R23.8 REDNESS AND SWELLING OF UPPER ARM: ICD-10-CM

## 2023-03-28 DIAGNOSIS — I80.8 SUPERFICIAL THROMBOPHLEBITIS OF LEFT UPPER EXTREMITY: ICD-10-CM

## 2023-03-28 DIAGNOSIS — R79.89 ELEVATED D-DIMER: ICD-10-CM

## 2023-03-28 DIAGNOSIS — U07.1 COVID-19 VIRUS INFECTION: ICD-10-CM

## 2023-03-28 DIAGNOSIS — R91.8 INFILTRATE OF LEFT LUNG PRESENT ON CHEST X-RAY: ICD-10-CM

## 2023-03-28 DIAGNOSIS — Z79.4 TYPE 2 DIABETES MELLITUS WITH DIABETIC NEUROPATHY, WITH LONG-TERM CURRENT USE OF INSULIN (MULTI): ICD-10-CM

## 2023-03-28 DIAGNOSIS — D64.9 ANEMIA, UNSPECIFIED TYPE: ICD-10-CM

## 2023-03-28 DIAGNOSIS — J12.9 VIRAL PNEUMONITIS: ICD-10-CM

## 2023-03-28 DIAGNOSIS — D75.839 THROMBOCYTOSIS: ICD-10-CM

## 2023-03-28 DIAGNOSIS — N17.9 ACUTE RENAL FAILURE, UNSPECIFIED ACUTE RENAL FAILURE TYPE (CMS-HCC): Primary | ICD-10-CM

## 2023-03-28 DIAGNOSIS — I10 ACCELERATED HYPERTENSION: ICD-10-CM

## 2023-03-28 PROBLEM — L03.114 CELLULITIS OF LEFT ARM: Status: RESOLVED | Noted: 2023-03-16 | Resolved: 2023-03-28

## 2023-03-28 PROBLEM — R11.2 NAUSEA VOMITING AND DIARRHEA: Status: RESOLVED | Noted: 2023-03-16 | Resolved: 2023-03-28

## 2023-03-28 PROBLEM — R05.9 COUGH: Status: RESOLVED | Noted: 2023-03-15 | Resolved: 2023-03-28

## 2023-03-28 PROBLEM — R19.7 NAUSEA VOMITING AND DIARRHEA: Status: RESOLVED | Noted: 2023-03-16 | Resolved: 2023-03-28

## 2023-03-28 PROBLEM — N28.9 ABNORMAL RENAL FUNCTION: Status: RESOLVED | Noted: 2023-03-15 | Resolved: 2023-03-28

## 2023-03-28 PROCEDURE — 4010F ACE/ARB THERAPY RXD/TAKEN: CPT | Performed by: INTERNAL MEDICINE

## 2023-03-28 PROCEDURE — 3077F SYST BP >= 140 MM HG: CPT | Performed by: INTERNAL MEDICINE

## 2023-03-28 PROCEDURE — 1159F MED LIST DOCD IN RCRD: CPT | Performed by: INTERNAL MEDICINE

## 2023-03-28 PROCEDURE — 1036F TOBACCO NON-USER: CPT | Performed by: INTERNAL MEDICINE

## 2023-03-28 PROCEDURE — 1160F RVW MEDS BY RX/DR IN RCRD: CPT | Performed by: INTERNAL MEDICINE

## 2023-03-28 PROCEDURE — 99215 OFFICE O/P EST HI 40 MIN: CPT | Performed by: INTERNAL MEDICINE

## 2023-03-28 PROCEDURE — 3078F DIAST BP <80 MM HG: CPT | Performed by: INTERNAL MEDICINE

## 2023-03-28 RX ORDER — HYDROCHLOROTHIAZIDE 25 MG/1
12.5 TABLET ORAL
Qty: 45 TABLET | Refills: 1 | Status: SHIPPED | OUTPATIENT
Start: 2023-03-28 | End: 2023-10-09 | Stop reason: ALTCHOICE

## 2023-03-28 ASSESSMENT — PAIN SCALES - GENERAL: PAINLEVEL: 0-NO PAIN

## 2023-03-28 NOTE — PATIENT INSTRUCTIONS
Restart Janumet.take one per day for the first 5-7 days and then go back to the 2 tabs per day.  Keep the 4 of the 81mg aspirins for another 2 weeks. Then resume one 81mg aspirin per day.  Continue warm compresses to left elbow are twice per day for another 2 weeks      Restart the hydrochlorothiazide but at 1/2 tab of the 25mg tabs.      Blood test in a month to recheck kidneys and anemia, you could wait and see if Dr Dennis wants to add anything to it.  Check bp at home tomorrow and then every few days.until bp comes down.  Call next week if you are getting numbers  at or over 160 on the top number..    Staying hydrated is important for many bodily functions. Generally consuming 48-64 ounces of water per day is recommended.  Avoid sodium--no villafana (they do make low sodium), no corned beef.    Chest xray approx. April 7th.    Keep may appt with Dr Ryan, and June appt. With me.  Sooner if needed.

## 2023-04-10 ENCOUNTER — PATIENT OUTREACH (OUTPATIENT)
Dept: PRIMARY CARE | Facility: CLINIC | Age: 68
End: 2023-04-10
Payer: MEDICARE

## 2023-04-10 DIAGNOSIS — I10 HYPERTENSION, UNSPECIFIED TYPE: ICD-10-CM

## 2023-04-10 NOTE — PROGRESS NOTES
Patient called me back. We discussed her blood pressure. She said it is back down from being high in the office. Her blood pressure today was 138/70 and that is where it has been the past few days. She is taking her normal dose of hydrochlorathiazide again. She is still taking the 4 baby aspirin but is going to just be taking one starting tomorrow or in a couple days. She states her surgery went well and she is going in to have Physical Therapy starting today. It will be twice a week for 4 weeks. I will call her to see how it is going in a couple weeks. She is not in need of anything else at this time. I told her she can call me anytime.

## 2023-04-21 ENCOUNTER — APPOINTMENT (OUTPATIENT)
Dept: LAB | Facility: LAB | Age: 68
End: 2023-04-21
Payer: MEDICARE

## 2023-04-21 LAB
ALANINE AMINOTRANSFERASE (SGPT) (U/L) IN SER/PLAS: 22 U/L (ref 7–45)
ALBUMIN (G/DL) IN SER/PLAS: 4.7 G/DL (ref 3.4–5)
ALKALINE PHOSPHATASE (U/L) IN SER/PLAS: 60 U/L (ref 33–136)
ANION GAP IN SER/PLAS: 13 MMOL/L (ref 10–20)
ASPARTATE AMINOTRANSFERASE (SGOT) (U/L) IN SER/PLAS: 21 U/L (ref 9–39)
BASOPHILS (10*3/UL) IN BLOOD BY AUTOMATED COUNT: 0.08 X10E9/L (ref 0–0.1)
BASOPHILS/100 LEUKOCYTES IN BLOOD BY AUTOMATED COUNT: 1.2 % (ref 0–2)
BILIRUBIN TOTAL (MG/DL) IN SER/PLAS: 0.9 MG/DL (ref 0–1.2)
CALCIUM (MG/DL) IN SER/PLAS: 10.6 MG/DL (ref 8.6–10.6)
CARBON DIOXIDE, TOTAL (MMOL/L) IN SER/PLAS: 29 MMOL/L (ref 21–32)
CHLORIDE (MMOL/L) IN SER/PLAS: 105 MMOL/L (ref 98–107)
CREATININE (MG/DL) IN SER/PLAS: 1.09 MG/DL (ref 0.5–1.05)
EOSINOPHILS (10*3/UL) IN BLOOD BY AUTOMATED COUNT: 0.38 X10E9/L (ref 0–0.7)
EOSINOPHILS/100 LEUKOCYTES IN BLOOD BY AUTOMATED COUNT: 5.6 % (ref 0–6)
ERYTHROCYTE DISTRIBUTION WIDTH (RATIO) BY AUTOMATED COUNT: 15.5 % (ref 11.5–14.5)
ERYTHROCYTE MEAN CORPUSCULAR HEMOGLOBIN CONCENTRATION (G/DL) BY AUTOMATED: 30.8 G/DL (ref 32–36)
ERYTHROCYTE MEAN CORPUSCULAR VOLUME (FL) BY AUTOMATED COUNT: 86 FL (ref 80–100)
ERYTHROCYTES (10*6/UL) IN BLOOD BY AUTOMATED COUNT: 4.61 X10E12/L (ref 4–5.2)
FERRITIN (UG/LL) IN SER/PLAS: 33 UG/L (ref 8–150)
GFR FEMALE: 55 ML/MIN/1.73M2
GLUCOSE (MG/DL) IN SER/PLAS: 153 MG/DL (ref 74–99)
HEMATOCRIT (%) IN BLOOD BY AUTOMATED COUNT: 39.6 % (ref 36–46)
HEMOGLOBIN (G/DL) IN BLOOD: 12.2 G/DL (ref 12–16)
IGA (MG/DL) IN SER/PLAS: 178 MG/DL (ref 70–400)
IMMATURE GRANULOCYTES/100 LEUKOCYTES IN BLOOD BY AUTOMATED COUNT: 0.1 % (ref 0–0.9)
IRON (UG/DL) IN SER/PLAS: 52 UG/DL (ref 35–150)
IRON BINDING CAPACITY (UG/DL) IN SER/PLAS: 493 UG/DL (ref 240–445)
IRON SATURATION (%) IN SER/PLAS: 11 % (ref 25–45)
LEUKOCYTES (10*3/UL) IN BLOOD BY AUTOMATED COUNT: 6.7 X10E9/L (ref 4.4–11.3)
LYMPHOCYTES (10*3/UL) IN BLOOD BY AUTOMATED COUNT: 3.09 X10E9/L (ref 1.2–4.8)
LYMPHOCYTES/100 LEUKOCYTES IN BLOOD BY AUTOMATED COUNT: 45.9 % (ref 13–44)
MONOCYTES (10*3/UL) IN BLOOD BY AUTOMATED COUNT: 0.39 X10E9/L (ref 0.1–1)
MONOCYTES/100 LEUKOCYTES IN BLOOD BY AUTOMATED COUNT: 5.8 % (ref 2–10)
NEUTROPHILS (10*3/UL) IN BLOOD BY AUTOMATED COUNT: 2.78 X10E9/L (ref 1.2–7.7)
NEUTROPHILS/100 LEUKOCYTES IN BLOOD BY AUTOMATED COUNT: 41.4 % (ref 40–80)
NRBC (PER 100 WBCS) BY AUTOMATED COUNT: 0 /100 WBC (ref 0–0)
PLATELETS (10*3/UL) IN BLOOD AUTOMATED COUNT: 402 X10E9/L (ref 150–450)
POTASSIUM (MMOL/L) IN SER/PLAS: 4.8 MMOL/L (ref 3.5–5.3)
PROTEIN TOTAL: 7.3 G/DL (ref 6.4–8.2)
SODIUM (MMOL/L) IN SER/PLAS: 142 MMOL/L (ref 136–145)
TISSUE TRANSGLUTAMINASE, IGA: <1 U/ML (ref 0–14)
UREA NITROGEN (MG/DL) IN SER/PLAS: 18 MG/DL (ref 6–23)

## 2023-04-27 ENCOUNTER — PATIENT OUTREACH (OUTPATIENT)
Dept: PRIMARY CARE | Facility: CLINIC | Age: 68
End: 2023-04-27
Payer: MEDICARE

## 2023-04-27 DIAGNOSIS — I10 HYPERTENSION, UNSPECIFIED TYPE: ICD-10-CM

## 2023-04-27 PROCEDURE — 99490 CHRNC CARE MGMT STAFF 1ST 20: CPT | Performed by: INTERNAL MEDICINE

## 2023-04-27 NOTE — PROGRESS NOTES
Patient returned my call. We discussed her lab results and she wanted to make sure Dr. Domingo could see her lab results from Dr. Dennis. We discussed physical therapy. She said it is going well, she is going to start spelling the alphabet with her ankle which will hopefully help loosen it up. She says her balance is still not ideal. Dr. Dennis was worried about her anemia so he ordered a colonoscopy to be done on 05/04/23. I will follow up with her once the results come back.

## 2023-04-28 LAB
OCCULT BLOOD, STOOL X1: NEGATIVE
OCCULT BLOOD, STOOL X2: NEGATIVE
OCCULT BLOOD, STOOL X3: NEGATIVE

## 2023-05-17 ENCOUNTER — PATIENT OUTREACH (OUTPATIENT)
Dept: PRIMARY CARE | Facility: CLINIC | Age: 68
End: 2023-05-17
Payer: MEDICARE

## 2023-05-17 DIAGNOSIS — I10 HYPERTENSION, UNSPECIFIED TYPE: ICD-10-CM

## 2023-05-17 PROCEDURE — 99490 CHRNC CARE MGMT STAFF 1ST 20: CPT | Performed by: INTERNAL MEDICINE

## 2023-05-17 NOTE — PROGRESS NOTES
I called and talked to the patient about her endoscopy she had and she states there were no signs of Barretts so she was happy about that. She is still in Physical Therapy for her foot and she feels like her balance is improving but still not 100%. I asked her about her blood pressure and she said it is usually in the 130's or 140's. She is taking all of her medications as prescribed and does not need anything from me at this time. I told her to call if she does.  
No pertinent past medical history

## 2023-06-03 NOTE — PROGRESS NOTES
"Subjective   Patient ID: Juanis Arboleda is a 68 y.o. female who presents for Follow-up (Follow up Coordination of care, DMII, follow up ankle surgery, and ER dehydration ([facility]).  Review stool FIT, review EGD/Colonoscopy with Dr. Dennis./\"Dr. Ryan is retiring.\" /\"Dr. Dennis didn't think anything about my anemia because I looked fine with all of my testing.\"/jwrn/).  LAST VISIT PLAN 3/28/2023  PATIENT DISCUSSION/SUMMARY:  Restart Janumet.take one per day for the first 5-7 days and then go back to the 2 tabs per day.  Keep the 4 of the 81mg aspirins for another 2 weeks. Then resume one 81mg aspirin per day.  Continue warm compresses to left elbow are twice per day for another 2 weeks  Restart the hydrochlorothiazide but at 1/2 tab of the 25mg tabs.  Blood test in a month to recheck kidneys and anemia, you could wait and see if Dr Dennis wants to add anything to it.  Check bp at home tomorrow and then every few days.until bp comes down.  Call next week if you are getting numbers  at or over 160 on the top number..  Staying hydrated is important for many bodily functions. Generally consuming 48-64 ounces of water per day is recommended.  Avoid sodium--no villafana (they do make low sodium), no corned beef.  Chest xray approx. April 7th.  Keep may appt with Dr Ryan, and June appt. With me.  Sooner if needed.      PROVIDER'S IMPRESSION:  Acute renal failure, unspecified acute renal failure type (CMS/HCC)  Anemia, unspecified type  Accelerated hypertension  Superficial thrombophlebitis of left upper extremity  Thrombocytosis (CMS/HCC)  Type 2 diabetes mellitus with diabetic neuropathy, with long-term current use of insulin (CMS/HCC)  COVID-19 virus infection  Viral pneumonitis  Redness and swelling of upper arm  Elevated d-dimer  Infiltrate of left lung present on chest x-ray  END INFO FROM PRIOR VISIT    HPI  To follow-up on hypertension diabetes currently managed by Endo but will start coming here.  As her Endo is retiring.  Had " covid end of feb.  No COVID vaccination, per patient preference.  She feels she has recovered. Sugar nellie have been worse after.  A1c better at 7.6%  Bp high, he said she was stressed out they about a bill of her husbands, I looked at that and it should have been paid, she is going to call medicare.  Discussed her anxiety, and consider therapy or medication.  She gets stressed a lot and worries.  She did not want medication or referral.  We talked about meditation, see patient instructions.  She could do a class she can do an farhad but it is something that she would need to work on regularly.  We discussed call made over-the-counter, she might consider trying this.    She is looking at clarity on dexcom and watching them for her sugar so she expects it to improve--her finger sticks are fairly close   Is on 3 meds: rocky met and insulin (2 insulins)  Had one low and she feels them, gets shaky and hits her quick it showed 60 and fingerstick was 80  Discussed farxiga empaglifozin and and jardiance as possible add-ons to her stitagliptan and dicussed renal and other benefits .if no abluminuria she may not need to change but it may help her a1c, will look into cost benefit with pharm d.    Creat 1.09   Nl urine albumin and ua sept 2022 so no microalbuminura  However egfr is 50's.  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 Told 10 years for the next 1  Egd no barretts and this is third one without so will stay on protonix for gerd and asa use but no more egd unless issues  Sees dr christine did review the reports    Lab reports reviewed  Iron sat and tibc abnl but serum iron ok and cbc now normal  B12  is ok on her b complex at 419  Discusse d d        Review of Systems  Cardiac: No CP , palpitations, increased jacobo  Resp: No sob, wheezing, cough  Extremities: No leg or ankle swelling, no claudication  Neuro: No dizziness, syncope, blurred vision. No new headaches.    Diabetes:Type II:Is taking medications  regularly, denies side effects.  Denies hypoglycemia, polyuria, polydipsia.  No new numbness or paresthesias of extremities.has same issue with her ankle chronic no syncope or dizziness.No blurred vision.   Had eye check up by dr hawk sept 2022-no retinopathy, report is on file     Objective   Lab Results   Component Value Date    WBC 6.7 04/21/2023    HGB 12.2 04/21/2023    HCT 39.6 04/21/2023     04/21/2023    CHOL 171 09/20/2022    TRIG 219 (H) 09/20/2022    HDL 34.6 (A) 09/20/2022    ALT 22 04/21/2023    AST 21 04/21/2023     04/21/2023    K 4.8 04/21/2023     04/21/2023    CREATININE 1.09 (H) 04/21/2023    BUN 18 04/21/2023    CO2 29 04/21/2023    TSH 2.43 01/25/2023    HGBA1C 8.0 05/06/2020      Latest Reference Range & Units 04/21/23 08:42   GLUCOSE 74 - 99 mg/dL 153 (H)   SODIUM 136 - 145 mmol/L 142   POTASSIUM 3.5 - 5.3 mmol/L 4.8   CHLORIDE 98 - 107 mmol/L 105   Bicarbonate 21 - 32 mmol/L 29   Anion Gap 10 - 20 mmol/L 13   Blood Urea Nitrogen 6 - 23 mg/dL 18   Creatinine 0.50 - 1.05 mg/dL 1.09 (H)   Calcium 8.6 - 10.6 mg/dL 10.6   Albumin 3.4 - 5.0 g/dL 4.7   Alkaline Phosphatase 33 - 136 U/L 60   ALT 7 - 45 U/L 22   AST 9 - 39 U/L 21   Bilirubin Total 0.0 - 1.2 mg/dL 0.9   FERRITIN 8 - 150 ug/L 33   Total Protein 6.4 - 8.2 g/dL 7.3   IRON 35 - 150 ug/dL 52   TIBC 240 - 445 ug/dL 493 (H)   % Saturation 25 - 45 % 11 (L)   WBC 4.4 - 11.3 x10E9/L 6.7   RBC 4.00 - 5.20 x10E12/L 4.61   HEMOGLOBIN 12.0 - 16.0 g/dL 12.2   HEMATOCRIT 36.0 - 46.0 % 39.6   MCV 80 - 100 fL 86   MCHC 32.0 - 36.0 g/dL 30.8 (L)   Platelets 150 - 450 x10E9/L 402   Neutrophils % 40.0 - 80.0 % 41.4   Immature Granulocytes %, Automated 0.0 - 0.9 % 0.1   Lymphocytes % 13.0 - 44.0 % 45.9   Monocytes % 2.0 - 10.0 % 5.8   Eosinophils % 0.0 - 6.0 % 5.6   Basophils % 0.0 - 2.0 % 1.2   Neutrophils Absolute 1.20 - 7.70 x10E9/L 2.78   Lymphocytes Absolute 1.20 - 4.80 x10E9/L 3.09   Monocytes Absolute 0.10 - 1.00 x10E9/L 0.39  "  Eosinophils Absolute 0.00 - 0.70 x10E9/L 0.38   Basophils Absolute 0.00 - 0.10 x10E9/L 0.08   nRBC 0.0 - 0.0 /100 WBC 0.0   RED CELL DISTRIBUTION WIDTH 11.5 - 14.5 % 15.5 (H)   Tissue Transglutaminase, IgA 0 - 14 U/mL <1   IgA 70 - 400 mg/dL 178   GFR Female >90 mL/min/1.73m2 55 !   (H): Data is abnormally high  (L): Data is abnormally low   Latest Reference Range & Units 04/24/23 00:00   OCCULT BLOOD, STOOL X1 Negative  NEGATIVE   OCCULT BLOOD X1, STOOL  Rpt   She actually did three stool for ob and they were all neg.    Re ckd urine albumin sept was normal so no microalbuminuria.    Physical ExamBP 170/75   Pulse 71   Resp 22   Ht 1.6 m (5' 3\")   Wt 71.9 kg (158 lb 9.6 oz)   BMI 28.09 kg/m²   Bp 122 at endo last week.    Patient looks well and is in no obvious distress.  HEENT:   Normocephalic, no facial asymmetry  Eyes: Sclera and conjunctiva are clear.  Neck: No adenopathy cervical (Ant/post/lat), no Supraclavicular nodes.   Thyroid normal.   Carotid pulses normal, no carotid bruits.  Lungs : RR normal. Clear to auscultation anterior, posterior and lateral. No rales, wheezes rhonchi or rubs. Good air exchange.  Heart: RRR. No Murmur, gallop, click or rub.  Abdomen: Bowel sounds normal, No bruits. No pulsatile mass. No hepatosplenomegaly, masses or tenderness. Soft, no guarding.  Vascular:  Posterior tibialis and dorsalis pedis pulses within normal limits bilaterally.   Extremities: no upper extremity edema. No lower extremity edema.   Musculoskeletal: no synovitis of joints seen. No new deformity.  Neuro: CN 2-12 intact. Alert, appropriate.   Ambulates independently.  No gross motor deficit.   No tremors.  Psych: normal mood and affect.  Skin: No  bruising petechiae or jaundice.  Rash tat is about 5mm papule with a blister scattered right and left arms and bialt legs, more arms then legsn none on trunk that I could see.  States rash is very Itchy. Look like  insectbites .  She feels it is a sun issue as " she has had it before.Or sun as occurred in watch area when went out without her watch. Has hx of this had 2017 betamethasone diproprionate cream that she was using and requests more.    Assessment/Plan   Problem List Items Addressed This Visit       Hyperlipidemia associated with type 2 diabetes mellitus (CMS/HCC)    HTN (hypertension)    Relevant Orders    Follow Up In [facility] - PCP    Renal function panel    Magnesium    Albumin, urine, random    Dermatitis    Psoriasis    RESOLVED: Anemia    Relevant Orders    CBC and Auto Differential    Iron and TIBC    Type 2 diabetes mellitus with diabetic neuropathy (CMS/[facility])    Relevant Orders    Follow Up In [facility] - PCP    Hemoglobin A1c    RESOLVED: Elevated d-dimer    Relevant Orders    D-dimer, quantitative    RESOLVED: Bilateral pulmonary infiltrates on chest x-ray    RESOLVED: COVID-19 virus infection    Diabetes mellitus (CMS/HCC)    Relevant Orders    Follow Up In [facility] - PCP-consider adding farxiga or jardiance. Will need to watch sugars if so.    Hemoglobin A1c    Follow Up In [facility] - Pharmacy    Follow Up In [facility] - PCP    Gastroesophageal reflux disease no barretts, no further surveillance egds needed, just if an issue. Stable on ppi which is also for asa gi protection.     Other Visit Diagnoses       Rash and nonspecific skin eruption    -  Primary    Relevant Medications    betamethasone dipropionate 0.05 % cream    Accelerated hypertension        Relevant Medications    metoprolol succinate XL (Toprol-XL) 50 mg 24 hr tablet     [id number]     1 mo ago  (4/21/23) 2 mo ago  (3/16/23) 4 mo ago  (1/25/23) 10 mo ago  (8/10/22) 1 yr ago  (5/11/22) 1 yr ago  (1/3/22) 1 yr ago  (12/8/21)     Glucose 74 - 99 mg/dL 153 High  99 133 High  134 High  136 High  146 High  186 High    Sodium 136 - 145 mmol/L 142 141 141 137 141 138 138   Potassium 3.5 - 5.3 mmol/L 4.8 4.9 4.8 4.9 4.9 4.6 4.3   Chloride  98 - 107 mmol/L 105 104 102 100 103 100 100   Bicarbonate 21 - 32 mmol/L 29 28 29 27 30 28 29   Anion Gap 10 - 20 mmol/L 13 14 15 15 13 15 13   Urea Nitrogen 6 - 23 mg/dL 18 15 22 14 20 21 23   Creatinine 0.50 - 1.05 mg/dL 1.09 High  0.98 1.09 High  0.93 1.20 High  1.02 1.21 High    Had had 3 results since January where egfr was 55  and several in the past 2 years as well, and has been under 90 pre 2019.  Urine albumin normal on 2 occasions, t9-2022 and 2 years ago.    Add amlodipine if bp does not come down  Does not have any known cad.chol ldl 93 sept.

## 2023-06-05 ENCOUNTER — E-VISIT (OUTPATIENT)
Dept: PRIMARY CARE | Facility: CLINIC | Age: 68
End: 2023-06-05
Payer: MEDICARE

## 2023-06-05 VITALS
DIASTOLIC BLOOD PRESSURE: 75 MMHG | BODY MASS INDEX: 28.1 KG/M2 | WEIGHT: 158.6 LBS | HEART RATE: 71 BPM | RESPIRATION RATE: 22 BRPM | SYSTOLIC BLOOD PRESSURE: 170 MMHG | HEIGHT: 63 IN

## 2023-06-05 DIAGNOSIS — Z79.4 TYPE 2 DIABETES MELLITUS WITH DIABETIC NEUROPATHY, WITH LONG-TERM CURRENT USE OF INSULIN (MULTI): ICD-10-CM

## 2023-06-05 DIAGNOSIS — E08.22 DIABETES MELLITUS DUE TO UNDERLYING CONDITION WITH STAGE 3A CHRONIC KIDNEY DISEASE, WITH LONG-TERM CURRENT USE OF INSULIN (MULTI): ICD-10-CM

## 2023-06-05 DIAGNOSIS — N18.31 DIABETES MELLITUS DUE TO UNDERLYING CONDITION WITH STAGE 3A CHRONIC KIDNEY DISEASE, WITH LONG-TERM CURRENT USE OF INSULIN (MULTI): ICD-10-CM

## 2023-06-05 DIAGNOSIS — I10 ACCELERATED HYPERTENSION: ICD-10-CM

## 2023-06-05 DIAGNOSIS — L40.9 PSORIASIS: ICD-10-CM

## 2023-06-05 DIAGNOSIS — R79.89 ELEVATED D-DIMER: ICD-10-CM

## 2023-06-05 DIAGNOSIS — R21 RASH AND NONSPECIFIC SKIN ERUPTION: Primary | ICD-10-CM

## 2023-06-05 DIAGNOSIS — E78.5 HYPERLIPIDEMIA ASSOCIATED WITH TYPE 2 DIABETES MELLITUS (MULTI): ICD-10-CM

## 2023-06-05 DIAGNOSIS — D50.8 OTHER IRON DEFICIENCY ANEMIA: ICD-10-CM

## 2023-06-05 DIAGNOSIS — Z79.4 DIABETES MELLITUS DUE TO UNDERLYING CONDITION WITH STAGE 3A CHRONIC KIDNEY DISEASE, WITH LONG-TERM CURRENT USE OF INSULIN (MULTI): ICD-10-CM

## 2023-06-05 DIAGNOSIS — E11.69 HYPERLIPIDEMIA ASSOCIATED WITH TYPE 2 DIABETES MELLITUS (MULTI): ICD-10-CM

## 2023-06-05 DIAGNOSIS — E11.40 TYPE 2 DIABETES MELLITUS WITH DIABETIC NEUROPATHY, WITH LONG-TERM CURRENT USE OF INSULIN (MULTI): ICD-10-CM

## 2023-06-05 DIAGNOSIS — I10 PRIMARY HYPERTENSION: ICD-10-CM

## 2023-06-05 DIAGNOSIS — L30.9 DERMATITIS: ICD-10-CM

## 2023-06-05 DIAGNOSIS — R91.8 BILATERAL PULMONARY INFILTRATES ON CHEST X-RAY: ICD-10-CM

## 2023-06-05 DIAGNOSIS — K21.9 GASTROESOPHAGEAL REFLUX DISEASE WITHOUT ESOPHAGITIS: ICD-10-CM

## 2023-06-05 DIAGNOSIS — U07.1 COVID-19 VIRUS INFECTION: ICD-10-CM

## 2023-06-05 PROBLEM — K63.5 POLYP OF COLON: Status: RESOLVED | Noted: 2023-06-05 | Resolved: 2023-06-05

## 2023-06-05 PROBLEM — D64.9 ANEMIA: Status: RESOLVED | Noted: 2023-03-15 | Resolved: 2023-06-05

## 2023-06-05 PROBLEM — N81.10 PROLAPSE OF VAGINAL WALLS WITHOUT UTERINE PROLAPSE: Status: ACTIVE | Noted: 2023-06-05

## 2023-06-05 PROBLEM — E87.1 HYPONATREMIA: Status: RESOLVED | Noted: 2023-03-07 | Resolved: 2023-06-05

## 2023-06-05 PROBLEM — R19.7 NAUSEA VOMITING AND DIARRHEA: Status: RESOLVED | Noted: 2023-03-07 | Resolved: 2023-06-05

## 2023-06-05 PROBLEM — M79.89 REDNESS AND SWELLING OF UPPER ARM: Status: RESOLVED | Noted: 2023-03-16 | Resolved: 2023-06-05

## 2023-06-05 PROBLEM — R11.2 NAUSEA VOMITING AND DIARRHEA: Status: RESOLVED | Noted: 2023-03-07 | Resolved: 2023-06-05

## 2023-06-05 PROBLEM — R23.8 REDNESS AND SWELLING OF UPPER ARM: Status: RESOLVED | Noted: 2023-03-16 | Resolved: 2023-06-05

## 2023-06-05 PROBLEM — R01.1 MURMUR: Status: ACTIVE | Noted: 2023-02-14

## 2023-06-05 PROBLEM — R12 HEARTBURN: Status: RESOLVED | Noted: 2023-03-15 | Resolved: 2023-06-05

## 2023-06-05 PROBLEM — S82.62XA: Status: RESOLVED | Noted: 2023-03-15 | Resolved: 2023-06-05

## 2023-06-05 PROBLEM — N17.9 AKI (ACUTE KIDNEY INJURY) (CMS-HCC): Status: RESOLVED | Noted: 2023-03-07 | Resolved: 2023-06-05

## 2023-06-05 PROBLEM — R19.7 NAUSEA VOMITING AND DIARRHEA: Status: ACTIVE | Noted: 2023-03-07

## 2023-06-05 PROBLEM — J12.9 VIRAL PNEUMONITIS: Status: RESOLVED | Noted: 2023-03-16 | Resolved: 2023-06-05

## 2023-06-05 PROBLEM — Z09 HOSPITAL DISCHARGE FOLLOW-UP: Status: RESOLVED | Noted: 2023-03-16 | Resolved: 2023-06-05

## 2023-06-05 PROBLEM — N95.0 POSTMENOPAUSAL BLEEDING: Status: RESOLVED | Noted: 2023-06-05 | Resolved: 2023-06-05

## 2023-06-05 PROBLEM — K22.70 BARRETT'S ESOPHAGUS: Status: RESOLVED | Noted: 2023-03-15 | Resolved: 2023-06-05

## 2023-06-05 PROBLEM — D50.9 IRON DEFICIENCY ANEMIA: Status: RESOLVED | Noted: 2023-04-20 | Resolved: 2023-06-05

## 2023-06-05 PROBLEM — E11.9 DIABETES MELLITUS (MULTI): Status: ACTIVE | Noted: 2023-02-14

## 2023-06-05 PROBLEM — N81.11 MIDLINE CYSTOCELE: Status: ACTIVE | Noted: 2023-06-05

## 2023-06-05 PROBLEM — R11.2 NAUSEA VOMITING AND DIARRHEA: Status: ACTIVE | Noted: 2023-03-07

## 2023-06-05 PROBLEM — E86.0 DEHYDRATION: Status: RESOLVED | Noted: 2023-03-16 | Resolved: 2023-06-05

## 2023-06-05 PROCEDURE — 99215 OFFICE O/P EST HI 40 MIN: CPT | Performed by: INTERNAL MEDICINE

## 2023-06-05 PROCEDURE — 1160F RVW MEDS BY RX/DR IN RCRD: CPT | Performed by: INTERNAL MEDICINE

## 2023-06-05 PROCEDURE — 4010F ACE/ARB THERAPY RXD/TAKEN: CPT | Performed by: INTERNAL MEDICINE

## 2023-06-05 PROCEDURE — 1159F MED LIST DOCD IN RCRD: CPT | Performed by: INTERNAL MEDICINE

## 2023-06-05 PROCEDURE — 3077F SYST BP >= 140 MM HG: CPT | Performed by: INTERNAL MEDICINE

## 2023-06-05 PROCEDURE — 3078F DIAST BP <80 MM HG: CPT | Performed by: INTERNAL MEDICINE

## 2023-06-05 PROCEDURE — 1036F TOBACCO NON-USER: CPT | Performed by: INTERNAL MEDICINE

## 2023-06-05 RX ORDER — METOPROLOL SUCCINATE 50 MG/1
50 TABLET, EXTENDED RELEASE ORAL
Qty: 90 TABLET | Refills: 3 | Status: SHIPPED | OUTPATIENT
Start: 2023-06-05 | End: 2023-07-17 | Stop reason: SDUPTHER

## 2023-06-05 RX ORDER — BETAMETHASONE DIPROPIONATE 0.5 MG/G
CREAM TOPICAL 2 TIMES DAILY PRN
Qty: 15 G | Refills: 0 | Status: SHIPPED | OUTPATIENT
Start: 2023-06-05 | End: 2023-10-03

## 2023-06-05 RX ORDER — HYDROCHLOROTHIAZIDE 12.5 MG/1
TABLET ORAL
COMMUNITY
Start: 2021-04-08 | End: 2023-10-09 | Stop reason: SDUPTHER

## 2023-06-05 RX ORDER — MULTIVITAMIN/IRON/FOLIC ACID 18MG-0.4MG
TABLET ORAL
COMMUNITY
Start: 2023-04-20 | End: 2023-10-09 | Stop reason: ALTCHOICE

## 2023-06-05 RX ORDER — LACTOBACILLUS RHAMNOSUS GG 10B CELL
CAPSULE ORAL
COMMUNITY
Start: 2023-04-20 | End: 2023-06-05 | Stop reason: ALTCHOICE

## 2023-06-05 RX ORDER — ASCORBIC ACID 500 MG
400 TABLET ORAL
COMMUNITY
Start: 2023-04-20 | End: 2023-10-09 | Stop reason: ALTCHOICE

## 2023-06-05 ASSESSMENT — PAIN SCALES - GENERAL: PAINLEVEL: 0-NO PAIN

## 2023-06-05 NOTE — PATIENT INSTRUCTIONS
Recommend learning meditation and relaxation. [facility] at  has programs on this if you are interested. There is also the CALM [place].   Consider calm aid by [employer], one per day as needed and especially on the day of your appointment here.    Betamethasone refilled to use on rash, if worse or not improving please see your dermatologist.  Can try zyrtec at bedtime for itching if needed. Stop if too sedating.  Monitor bp at home. Call if it is not coming down.    Iron rich foods in diet.  Stay on pantoprazole.    Blood test to check a1c  iron, blood counts, kidneys and urine albumin in 3 months just before follow up appt. here. Needs to be after august 25th fyi.    Follow up 3 months.  We did place a consult to pharmacy, you may here from [facility] or [facility].

## 2023-06-16 ENCOUNTER — TELEMEDICINE (OUTPATIENT)
Dept: PHARMACY | Facility: HOSPITAL | Age: 68
End: 2023-06-16
Payer: MEDICARE

## 2023-06-16 DIAGNOSIS — E08.22 DIABETES MELLITUS DUE TO UNDERLYING CONDITION WITH STAGE 3A CHRONIC KIDNEY DISEASE, WITH LONG-TERM CURRENT USE OF INSULIN (MULTI): ICD-10-CM

## 2023-06-16 DIAGNOSIS — Z79.4 DIABETES MELLITUS DUE TO UNDERLYING CONDITION WITH STAGE 3A CHRONIC KIDNEY DISEASE, WITH LONG-TERM CURRENT USE OF INSULIN (MULTI): ICD-10-CM

## 2023-06-16 DIAGNOSIS — N18.31 DIABETES MELLITUS DUE TO UNDERLYING CONDITION WITH STAGE 3A CHRONIC KIDNEY DISEASE, WITH LONG-TERM CURRENT USE OF INSULIN (MULTI): ICD-10-CM

## 2023-06-16 NOTE — PROGRESS NOTES
Patient is sent at the request of Megan Domingo MD for my opinion regarding Type 2 diabetes.  My final recommendations will be communicated back to the requesting provider by way of shared medical record.    Subjective     Diabetes  She presents for her initial diabetic visit. She has type 2 diabetes mellitus. Onset time: Since she was 41 years old. There are no hypoglycemic associated symptoms. Current diabetic treatment includes insulin injections and oral agent (dual therapy) (She is on Janumet BID as well as Tresiba and Novolog TID with meals). She is currently taking insulin pre-lunch, pre-breakfast, at bedtime and pre-dinner. Insulin injections are given by patient. She is following a generally healthy diet. An ACE inhibitor/angiotensin II receptor blocker is being taken (Valsartan 320 mg). Eye exam is current.       Current diet:     Breakfast: toast and scrambled eggs, oatmeal, cereal, tends to vary   Lunch: yogurt, veggies, cheese and crackers  Dinner:  chicken, fish, ground beef + veggie, sometimes pasta, rice or potatoes, salads   Snacks: pretzel, nuts, sometimes ice cream   Drinks: water, unsweet ice tea, coffee with cream     Allergies   Allergen Reactions    Exenatide Other and Nausea Only    Insulin Detemir Other    Ramipril Cough    Penicillins Rash     Blood Glucose Monitoring:    Patient is using: continuous glucose monitor  The patient is currently checking the blood glucose 4 times per day.      Hypoglycemia frequency:  occasionally but does check with a fingerprick but she did have one in the 70's   Hypoglycemia awareness: No, she gets shakey when she gets low       Adverse Effects:   None      Objective     There were no vitals taken for this visit.    Diabetes Pharmacotherapy:  Janumet 50-1000mg BID  Novolog-    14  units with breakfast + SSI    8 units at lunch + SSI    22 units with dinner + SSI   Sliding scale used:   1 unit if 150-199 mg/dL   2 units if 200-249 mg/dL   3 units if  250-299 mg/dL   Tresiba 54 units at bedtime     SECONDARY PREVENTION  - Statin? Yes, atorvastatin 20mg   - ACE-I/ARB? Yes, Valsartan 320 mg   - Aspirin? Yes    Pertinent PMH Review:  - PMH of Pancreatitis: No  - PMH of Retinopathy: No  - PMH of Urinary Tract Infections: No, but had yeast infections when she was on birth control   - PMH of MTC: No    Lab Review  Lab Results   Component Value Date    BILITOT 0.9 04/21/2023    CALCIUM 10.6 04/21/2023    CO2 29 04/21/2023     04/21/2023    CREATININE 1.09 (H) 04/21/2023    GLUCOSE 153 (H) 04/21/2023    ALKPHOS 60 04/21/2023    K 4.8 04/21/2023    PROT 7.3 04/21/2023     04/21/2023    AST 21 04/21/2023    ALT 22 04/21/2023    BUN 18 04/21/2023    ANIONGAP 13 04/21/2023    MG 1.62 06/29/2021    PHOS 3.7 11/10/2020    ALBUMIN 4.7 04/21/2023    GFRF 55 (A) 04/21/2023     Lab Results   Component Value Date    TRIG 219 (H) 09/20/2022    CHOL 171 09/20/2022    HDL 34.6 (A) 09/20/2022   LDL       93  9/20/2022  Lab Results   Component Value Date    HGBA1C 8.0 05/06/2020   A1c 7.6% per Dr. Park note on 6/5/23  The 10-year ASCVD risk score (Carolina AMANDA, et al., 2019) is: 38.3%    Values used to calculate the score:      Age: 68 years      Sex: Female      Is Non- : No      Diabetic: Yes      Tobacco smoker: No      Systolic Blood Pressure: 170 mmHg      Is BP treated: Yes      HDL Cholesterol: 34.6 mg/dL      Total Cholesterol: 171 mg/dL    Health Maintenance:   Foot Exam: unknown   Eye Exam: annually   Lipid Panel: 9/20/22    Drug Interactions:  There are no drug interactions that need to be addressed with patient's current therapy.    Assessment/Plan   Problem List Items Addressed This Visit       Diabetes mellitus (CMS/[facility])     Patients diabetes is not controlled  with most recent A1c of 7.6%  on 6/5/23 (Goal < 7%).     Increase:   Novolog at dinner to 24 units + SSI   Continue:   Janumet 50-1000mg BID  Novolog-   - 14  units with  breakfast + SSI   -  8 units at lunch + SSI    -Sliding scale used:   1 unit if 150-199 mg/dL   2 units if 200-249 mg/dL   3 units if 250-299 mg/dL   Compliance at present is estimated to be good. Efforts to improve compliance (if necessary) will be directed at dietary modifications: continue on having a healthy balanced diet. and increased exercise.  Education Provided to Patient:    Went over patient goals  - A1c <7%   - Fasting BG 70-130mg/dL   -  2 hours post prandial <180mg/dL  Went over the benefit of the addition of a SGLT2 inhibitor for her CKD and DM control.  I let the patient know that both Jardiance and Farxiga cost Roughly $150. Patient stated that she would only switch from Janumet to this (which she said Dr. Domingo mentioned- Told the patient I would ask Niraj Domingo but in her note she mentioned the addition). Assessed if the patient would qualify for  PAP and she would not based on their annual income.     Follow-up: I recommend diabetes care be  around one month. Will reach out the patient this next week to schedule  .  PCP Follow-Up: 10/9/2023    Continue all meds under the continuation of care with the referring provider and clinical pharmacy team.  Domenica Grijalva, Orly   Meds PGY1 Pharmacy Resident     Verbal consent to manage patient’s drug therapy was obtained from the patient. They were informed they may decline to participate or withdraw from participation in pharmacy services at any time.

## 2023-06-19 NOTE — PROGRESS NOTES
I reviewed the progress note and agree with the resident’s findings and plans as written. Case discussed with resident.    Fermin Cadena, CharanjitD

## 2023-06-22 ENCOUNTER — TELEPHONE (OUTPATIENT)
Dept: PHARMACY | Facility: HOSPITAL | Age: 68
End: 2023-06-22
Payer: MEDICARE

## 2023-06-22 NOTE — TELEPHONE ENCOUNTER
Left Juanis a message to call me back for me to let her know what Dr. Domingo was wanting the Jardiance to be add on therapy for her diabetes and not to replace her Janumet. I will go over the benefits again when I speak with her.     Please contact the Clinical Pharmacy Team with any Questions. Thank you  Domenica Grijalva, PharmD    Meds PGY-1 Pharmacy Resident

## 2023-06-26 ENCOUNTER — TELEPHONE (OUTPATIENT)
Dept: PHARMACY | Facility: HOSPITAL | Age: 68
End: 2023-06-26
Payer: MEDICARE

## 2023-06-26 NOTE — TELEPHONE ENCOUNTER
Spoke to Juanis about the addition of a SGLT-2 inhibitor like Dr. Domingo had mentioned and she said that she would have to think about it due to the cost. I did go over the benefits of the medication with her. She will reach out to the office if she wants to start the medication.     Please contact the Clinical Pharmacy Team with any Questions.   Thank you  Domenica Grijalva, PharmD    Meds PGY-1 Pharmacy Resident

## 2023-06-30 ENCOUNTER — PATIENT OUTREACH (OUTPATIENT)
Dept: PRIMARY CARE | Facility: CLINIC | Age: 68
End: 2023-06-30
Payer: MEDICARE

## 2023-06-30 DIAGNOSIS — Z79.4 TYPE 2 DIABETES MELLITUS WITH DIABETIC NEUROPATHY, WITH LONG-TERM CURRENT USE OF INSULIN (MULTI): ICD-10-CM

## 2023-06-30 DIAGNOSIS — E11.40 TYPE 2 DIABETES MELLITUS WITH DIABETIC NEUROPATHY, WITH LONG-TERM CURRENT USE OF INSULIN (MULTI): ICD-10-CM

## 2023-06-30 DIAGNOSIS — I10 HYPERTENSION, UNSPECIFIED TYPE: ICD-10-CM

## 2023-06-30 PROCEDURE — 99490 CHRNC CARE MGMT STAFF 1ST 20: CPT | Performed by: INTERNAL MEDICINE

## 2023-06-30 NOTE — PROGRESS NOTES
I called and talked to the patient about her blood sugar and blood pressure. She said she is going to hold off on the addition of a SGLT-2 inhibitor for now due to the cost, but she was under the impression it was going to replace the Janument instead of be in addition to it. She said she spends so much per year on the Janument she cannot afford to add another expensive medication. Her insurance should be changing next year so she might do it then if she does not have to pay as much. She has an appointment with an Endocrinologist but could not get in until November. I asked her what her blood pressure has been because it was 170 when she saw Dr. Domingo. She says it is usually around 140/70 most days, but she can get it into the 120's if she is real calm. She does not need anything at this time.

## 2023-07-17 DIAGNOSIS — I10 ACCELERATED HYPERTENSION: ICD-10-CM

## 2023-07-17 RX ORDER — METOPROLOL SUCCINATE 50 MG/1
50 TABLET, EXTENDED RELEASE ORAL
Qty: 90 TABLET | Refills: 3 | Status: SHIPPED | OUTPATIENT
Start: 2023-07-17 | End: 2024-05-22 | Stop reason: SDUPTHER

## 2023-08-07 DIAGNOSIS — K21.9 GASTROESOPHAGEAL REFLUX DISEASE WITHOUT ESOPHAGITIS: ICD-10-CM

## 2023-08-07 RX ORDER — PANTOPRAZOLE SODIUM 20 MG/1
40 TABLET, DELAYED RELEASE ORAL
Qty: 60 TABLET | Refills: 3 | Status: SHIPPED | OUTPATIENT
Start: 2023-08-07 | End: 2023-10-09 | Stop reason: SDUPTHER

## 2023-08-29 ENCOUNTER — PATIENT OUTREACH (OUTPATIENT)
Dept: PRIMARY CARE | Facility: CLINIC | Age: 68
End: 2023-08-29
Payer: MEDICARE

## 2023-08-29 DIAGNOSIS — I10 HYPERTENSION, UNSPECIFIED TYPE: ICD-10-CM

## 2023-08-29 DIAGNOSIS — E11.40 TYPE 2 DIABETES MELLITUS WITH DIABETIC NEUROPATHY, WITH LONG-TERM CURRENT USE OF INSULIN (MULTI): ICD-10-CM

## 2023-08-29 DIAGNOSIS — Z79.4 TYPE 2 DIABETES MELLITUS WITH DIABETIC NEUROPATHY, WITH LONG-TERM CURRENT USE OF INSULIN (MULTI): ICD-10-CM

## 2023-08-29 PROCEDURE — 99490 CHRNC CARE MGMT STAFF 1ST 20: CPT | Performed by: INTERNAL MEDICINE

## 2023-08-29 NOTE — PROGRESS NOTES
Patient called back. We discussed her blood pressure. She said her blood pressure has been ranging between 128/72 and 152/65. Patient says a lot of her blood pressure levels are in the 130's systolic. She said everything has been going pretty well lately. She said she still has foot pain every now and then s/p surgery back in April and she is meeting with ortho on Thursday. She has an appointment with Endocrinology in October. Patient is not in need of anything at this time. She will be out of town for a lot of September.

## 2023-10-05 ENCOUNTER — LAB (OUTPATIENT)
Dept: LAB | Facility: LAB | Age: 68
End: 2023-10-05
Payer: MEDICARE

## 2023-10-05 DIAGNOSIS — Z79.4 TYPE 2 DIABETES MELLITUS WITH DIABETIC NEUROPATHY, WITH LONG-TERM CURRENT USE OF INSULIN (MULTI): ICD-10-CM

## 2023-10-05 DIAGNOSIS — I10 PRIMARY HYPERTENSION: ICD-10-CM

## 2023-10-05 DIAGNOSIS — R79.89 ELEVATED D-DIMER: ICD-10-CM

## 2023-10-05 DIAGNOSIS — D50.8 OTHER IRON DEFICIENCY ANEMIA: ICD-10-CM

## 2023-10-05 DIAGNOSIS — E11.40 TYPE 2 DIABETES MELLITUS WITH DIABETIC NEUROPATHY, WITH LONG-TERM CURRENT USE OF INSULIN (MULTI): ICD-10-CM

## 2023-10-05 DIAGNOSIS — D64.9 ANEMIA, UNSPECIFIED TYPE: ICD-10-CM

## 2023-10-05 DIAGNOSIS — N17.9 ACUTE RENAL FAILURE, UNSPECIFIED ACUTE RENAL FAILURE TYPE (CMS-HCC): ICD-10-CM

## 2023-10-05 PROCEDURE — 82570 ASSAY OF URINE CREATININE: CPT

## 2023-10-05 PROCEDURE — 85379 FIBRIN DEGRADATION QUANT: CPT

## 2023-10-05 PROCEDURE — 83540 ASSAY OF IRON: CPT

## 2023-10-05 PROCEDURE — 80069 RENAL FUNCTION PANEL: CPT

## 2023-10-05 PROCEDURE — 83550 IRON BINDING TEST: CPT

## 2023-10-05 PROCEDURE — 85025 COMPLETE CBC W/AUTO DIFF WBC: CPT

## 2023-10-05 PROCEDURE — 82043 UR ALBUMIN QUANTITATIVE: CPT

## 2023-10-05 PROCEDURE — 83735 ASSAY OF MAGNESIUM: CPT

## 2023-10-05 PROCEDURE — 83036 HEMOGLOBIN GLYCOSYLATED A1C: CPT

## 2023-10-05 PROCEDURE — 36415 COLL VENOUS BLD VENIPUNCTURE: CPT

## 2023-10-06 LAB
ALBUMIN SERPL BCP-MCNC: 4.4 G/DL (ref 3.4–5)
ANION GAP SERPL CALC-SCNC: 14 MMOL/L (ref 10–20)
BASOPHILS # BLD AUTO: 0.08 X10*3/UL (ref 0–0.1)
BASOPHILS NFR BLD AUTO: 0.9 %
BUN SERPL-MCNC: 19 MG/DL (ref 6–23)
CALCIUM SERPL-MCNC: 10.4 MG/DL (ref 8.6–10.6)
CHLORIDE SERPL-SCNC: 104 MMOL/L (ref 98–107)
CO2 SERPL-SCNC: 27 MMOL/L (ref 21–32)
CREAT SERPL-MCNC: 1.11 MG/DL (ref 0.5–1.05)
CREAT UR-MCNC: 43.1 MG/DL (ref 20–320)
D DIMER PPP FEU-MCNC: 992 NG/ML FEU
EOSINOPHIL # BLD AUTO: 0.27 X10*3/UL (ref 0–0.7)
EOSINOPHIL NFR BLD AUTO: 3.1 %
ERYTHROCYTE [DISTWIDTH] IN BLOOD BY AUTOMATED COUNT: 15.2 % (ref 11.5–14.5)
EST. AVERAGE GLUCOSE BLD GHB EST-MCNC: 183 MG/DL
GFR SERPL CREATININE-BSD FRML MDRD: 54 ML/MIN/1.73M*2
GLUCOSE SERPL-MCNC: 146 MG/DL (ref 74–99)
HBA1C MFR BLD: 8 %
HCT VFR BLD AUTO: 40.3 % (ref 36–46)
HGB BLD-MCNC: 12.5 G/DL (ref 12–16)
IMM GRANULOCYTES # BLD AUTO: 0.04 X10*3/UL (ref 0–0.7)
IMM GRANULOCYTES NFR BLD AUTO: 0.5 % (ref 0–0.9)
IRON SATN MFR SERPL: 19 % (ref 25–45)
IRON SERPL-MCNC: 84 UG/DL (ref 35–150)
LYMPHOCYTES # BLD AUTO: 3.91 X10*3/UL (ref 1.2–4.8)
LYMPHOCYTES NFR BLD AUTO: 45.3 %
MAGNESIUM SERPL-MCNC: 1.68 MG/DL (ref 1.6–2.4)
MCH RBC QN AUTO: 27.6 PG (ref 26–34)
MCHC RBC AUTO-ENTMCNC: 31 G/DL (ref 32–36)
MCV RBC AUTO: 89 FL (ref 80–100)
MICROALBUMIN UR-MCNC: <7 MG/L
MICROALBUMIN/CREAT UR: NORMAL MG/G{CREAT}
MONOCYTES # BLD AUTO: 0.52 X10*3/UL (ref 0.1–1)
MONOCYTES NFR BLD AUTO: 6 %
NEUTROPHILS # BLD AUTO: 3.82 X10*3/UL (ref 1.2–7.7)
NEUTROPHILS NFR BLD AUTO: 44.2 %
NRBC BLD-RTO: 0 /100 WBCS (ref 0–0)
PHOSPHATE SERPL-MCNC: 3.6 MG/DL (ref 2.5–4.9)
PLATELET # BLD AUTO: 359 X10*3/UL (ref 150–450)
PMV BLD AUTO: 10.8 FL (ref 7.5–11.5)
POTASSIUM SERPL-SCNC: 4.5 MMOL/L (ref 3.5–5.3)
RBC # BLD AUTO: 4.53 X10*6/UL (ref 4–5.2)
SODIUM SERPL-SCNC: 140 MMOL/L (ref 136–145)
TIBC SERPL-MCNC: 454 UG/DL (ref 240–445)
UIBC SERPL-MCNC: 370 UG/DL (ref 110–370)
WBC # BLD AUTO: 8.6 X10*3/UL (ref 4.4–11.3)

## 2023-10-09 ENCOUNTER — OFFICE VISIT (OUTPATIENT)
Dept: PRIMARY CARE | Facility: CLINIC | Age: 68
End: 2023-10-09
Payer: MEDICARE

## 2023-10-09 VITALS
RESPIRATION RATE: 18 BRPM | BODY MASS INDEX: 28.89 KG/M2 | HEART RATE: 72 BPM | SYSTOLIC BLOOD PRESSURE: 132 MMHG | WEIGHT: 157 LBS | HEIGHT: 62 IN | DIASTOLIC BLOOD PRESSURE: 58 MMHG

## 2023-10-09 DIAGNOSIS — N18.31 DIABETES MELLITUS DUE TO UNDERLYING CONDITION WITH STAGE 3A CHRONIC KIDNEY DISEASE, WITH LONG-TERM CURRENT USE OF INSULIN (MULTI): ICD-10-CM

## 2023-10-09 DIAGNOSIS — R10.11 RUQ PAIN: Primary | ICD-10-CM

## 2023-10-09 DIAGNOSIS — E11.22 HYPERTENSION ASSOCIATED WITH STAGE 3 CHRONIC KIDNEY DISEASE DUE TO TYPE 2 DIABETES MELLITUS (MULTI): ICD-10-CM

## 2023-10-09 DIAGNOSIS — Z28.21 INFLUENZA VACCINATION DECLINED: ICD-10-CM

## 2023-10-09 DIAGNOSIS — Z11.59 NEED FOR HEPATITIS C SCREENING TEST: ICD-10-CM

## 2023-10-09 DIAGNOSIS — E78.00 HYPERCHOLESTEROLEMIA: ICD-10-CM

## 2023-10-09 DIAGNOSIS — Z12.31 VISIT FOR SCREENING MAMMOGRAM: ICD-10-CM

## 2023-10-09 DIAGNOSIS — Z79.4 DIABETES MELLITUS DUE TO UNDERLYING CONDITION WITH STAGE 3A CHRONIC KIDNEY DISEASE, WITH LONG-TERM CURRENT USE OF INSULIN (MULTI): ICD-10-CM

## 2023-10-09 DIAGNOSIS — S92.902S: ICD-10-CM

## 2023-10-09 DIAGNOSIS — K21.9 GASTROESOPHAGEAL REFLUX DISEASE WITHOUT ESOPHAGITIS: ICD-10-CM

## 2023-10-09 DIAGNOSIS — N18.31 CHRONIC KIDNEY DISEASE, STAGE 3A (MULTI): ICD-10-CM

## 2023-10-09 DIAGNOSIS — E78.5 HYPERLIPIDEMIA ASSOCIATED WITH TYPE 2 DIABETES MELLITUS (MULTI): ICD-10-CM

## 2023-10-09 DIAGNOSIS — N18.30 HYPERTENSION ASSOCIATED WITH STAGE 3 CHRONIC KIDNEY DISEASE DUE TO TYPE 2 DIABETES MELLITUS (MULTI): ICD-10-CM

## 2023-10-09 DIAGNOSIS — E11.69 HYPERLIPIDEMIA ASSOCIATED WITH TYPE 2 DIABETES MELLITUS (MULTI): ICD-10-CM

## 2023-10-09 DIAGNOSIS — I12.9 HYPERTENSION ASSOCIATED WITH STAGE 3 CHRONIC KIDNEY DISEASE DUE TO TYPE 2 DIABETES MELLITUS (MULTI): ICD-10-CM

## 2023-10-09 DIAGNOSIS — E11.641 TYPE 2 DIABETES MELLITUS WITH HYPOGLYCEMIA AND COMA, WITH LONG-TERM CURRENT USE OF INSULIN (MULTI): ICD-10-CM

## 2023-10-09 DIAGNOSIS — Z79.4 TYPE 2 DIABETES MELLITUS WITH HYPOGLYCEMIA AND COMA, WITH LONG-TERM CURRENT USE OF INSULIN (MULTI): ICD-10-CM

## 2023-10-09 DIAGNOSIS — E08.22 DIABETES MELLITUS DUE TO UNDERLYING CONDITION WITH STAGE 3A CHRONIC KIDNEY DISEASE, WITH LONG-TERM CURRENT USE OF INSULIN (MULTI): ICD-10-CM

## 2023-10-09 DIAGNOSIS — I15.9 SECONDARY HYPERTENSION: ICD-10-CM

## 2023-10-09 DIAGNOSIS — R10.33 PERIUMBILICAL ABDOMINAL PAIN: ICD-10-CM

## 2023-10-09 LAB
POC APPEARANCE, URINE: CLEAR
POC BILIRUBIN, URINE: NEGATIVE
POC BLOOD, URINE: NEGATIVE
POC COLOR, URINE: YELLOW
POC GLUCOSE, URINE: NEGATIVE MG/DL
POC KETONES, URINE: NEGATIVE MG/DL
POC LEUKOCYTES, URINE: NEGATIVE
POC NITRITE,URINE: NEGATIVE
POC PH, URINE: 6.5 PH
POC PROTEIN, URINE: NEGATIVE MG/DL
POC SPECIFIC GRAVITY, URINE: 1.01
POC UROBILINOGEN, URINE: 0.2 EU/DL

## 2023-10-09 PROCEDURE — 1159F MED LIST DOCD IN RCRD: CPT | Performed by: INTERNAL MEDICINE

## 2023-10-09 PROCEDURE — 4010F ACE/ARB THERAPY RXD/TAKEN: CPT | Performed by: INTERNAL MEDICINE

## 2023-10-09 PROCEDURE — 3062F POS MACROALBUMINURIA REV: CPT | Performed by: INTERNAL MEDICINE

## 2023-10-09 PROCEDURE — 3049F LDL-C 100-129 MG/DL: CPT | Performed by: INTERNAL MEDICINE

## 2023-10-09 PROCEDURE — 99215 OFFICE O/P EST HI 40 MIN: CPT | Performed by: INTERNAL MEDICINE

## 2023-10-09 PROCEDURE — 3078F DIAST BP <80 MM HG: CPT | Performed by: INTERNAL MEDICINE

## 2023-10-09 PROCEDURE — 3066F NEPHROPATHY DOC TX: CPT | Performed by: INTERNAL MEDICINE

## 2023-10-09 PROCEDURE — 3075F SYST BP GE 130 - 139MM HG: CPT | Performed by: INTERNAL MEDICINE

## 2023-10-09 PROCEDURE — 1036F TOBACCO NON-USER: CPT | Performed by: INTERNAL MEDICINE

## 2023-10-09 PROCEDURE — 81003 URINALYSIS AUTO W/O SCOPE: CPT | Performed by: INTERNAL MEDICINE

## 2023-10-09 PROCEDURE — 3052F HG A1C>EQUAL 8.0%<EQUAL 9.0%: CPT | Performed by: INTERNAL MEDICINE

## 2023-10-09 PROCEDURE — 1126F AMNT PAIN NOTED NONE PRSNT: CPT | Performed by: INTERNAL MEDICINE

## 2023-10-09 PROCEDURE — 1160F RVW MEDS BY RX/DR IN RCRD: CPT | Performed by: INTERNAL MEDICINE

## 2023-10-09 RX ORDER — HYDROCHLOROTHIAZIDE 12.5 MG/1
TABLET ORAL
Qty: 90 TABLET | Refills: 2 | Status: SHIPPED | OUTPATIENT
Start: 2023-10-09 | End: 2024-05-22 | Stop reason: SDUPTHER

## 2023-10-09 RX ORDER — PANTOPRAZOLE SODIUM 20 MG/1
20 TABLET, DELAYED RELEASE ORAL
Qty: 90 TABLET | Refills: 2 | Status: SHIPPED | OUTPATIENT
Start: 2023-10-09

## 2023-10-09 RX ORDER — BLOOD SUGAR DIAGNOSTIC
STRIP MISCELLANEOUS
Qty: 100 STRIP | Refills: 3 | Status: SHIPPED | OUTPATIENT
Start: 2023-10-09 | End: 2023-10-18 | Stop reason: SDUPTHER

## 2023-10-09 ASSESSMENT — PAIN SCALES - GENERAL: PAINLEVEL: 0-NO PAIN

## 2023-10-09 NOTE — PROGRESS NOTES
Subjective   Patient ID: Juanis Arboleda is a 68 y.o. female who presents for Follow-up (Pt here for follow up and review. PT does not want a flu shot this year. Pt had some med changes and would like to discuss).  LAST VISIT PLAN 3/28/2023  PATIENT'S DISCUSSION/SUMMARY:  Restart Janumet.take one per day for the first 5-7 days and then go back to the 2 tabs per day.  Keep the 4 of the 81mg aspirins for another 2 weeks. Then resume one 81mg aspirin per day.  Continue warm compresses to left elbow are twice per day for another 2 weeks  Restart the hydrochlorothiazide but at 1/2 tab of the 25mg tabs.  Blood test in a month to recheck kidneys and anemia, you could wait and see if Dr Dennis wants to add anything to it.  Check bp at home tomorrow and then every few days.until bp comes down.  Call next week if you are getting numbers  at or over 160 on the top number..  Staying hydrated is important for many bodily functions. Generally consuming 48-64 ounces of water per day is recommended.  Avoid sodium--no villafana (they do make low sodium), no corned beef.  Chest xray approx. April 7th.  Keep may appt with Dr Ryan, and June appt. With me.  Sooner if needed.      PROVIDER'S IMPRESSIONS:  Acute renal failure, unspecified acute renal failure type (CMS/[facility])  Anemia, unspecified type  Accelerated hypertension  Superficial thrombophlebitis of left upper extremity  Thrombocytosis  Type 2 diabetes mellitus with diabetic neuropathy, with long-term current use of insulin (CMS/[facility])  COVID-19 virus infection  Viral pneumonitis  Redness and swelling of upper arm  Elevated d-dimer  Infiltrate of left lung present on chest x-ray  Orders Placed  CBC and Auto Differential  Basic metabolic panel  XR chest 2 views  Medication Changes  hydrochlorothiazide 12.5 mg oral Every Day, Currently on hold since admission  Encounters with Related Results  Lab (10/5/2023)  END INFO FROM PRIOR VISIT    HPI  To follow-up on diabetes hypertension  "hyperlipidemia has a chronic left peroneal neuropathy.  Addition, she broke her foot this summer turning with sandal on this summer. Has hx of peroneal nerve issue. Went to dr javier. They told her a month later.  Apparently they missed it on the x-ray initially but it is healing.  She went back end of last month 75% healed.    This is same side as broken ankle in the past    She notes her chronic intermittent abd pain :she said told it was gastropathy \"neuropathy\"  Burning pains in her abdomen that goes to her back not sure if goes thru or around. It is on the right side. Years ago when this started, told it was neuropathy. Saw a neurologist then who said diabetic neuropathy.    Had egd and colonoscopy April 2023 and all fine per pt.   She still has her gallbladder.  It as nothing to do with eating.  Occ sharp pain. Usually burning.  We discussed could it be adhesions?.  However that is a difficult diagnosis to make is also not seen on imaging.  Had ruq ultrasound jan 2022 for same issue and neg other than fatty liver.    Has not had a gb fxn test.    Does have slow digestion, had stomach emptying test and dx diabetic gastropathy .    Sugars are fluctuating, not eating differently but sugars jump up    A7 days 145  14 days 167  30 days 185  90 days 183   Avg glucose.  Was on vacation, A1c was up today at8.0  May at Dr sanchez office it was 7.7.  She is going to see dr elva peter at [facility] [place] , nov. 6th.    She di dnot start jardiance as it was too costly until the first of the year and she did not want to pay for it and the synjardy.  Discussed that if her sugars became more controlled then we could see about keeping only will 1 part of the Synjardy.  But she can discuss this with Dr. Estrella.  I do think it is important for her to try getting on the SGLT2 I with her kidney disease.  Review of Systems    Cardiac: No CP , palpitations, increased jacobo  Resp: No sob, wheezing, cough  Extremities: No leg or ankle swelling, no " "claudication  Neuro: No complaints of dizziness, syncope, blurred vision. No new headaches.    No  polyuria polydipsia complaints.  Current Outpatient Medications   Medication Instructions    ASCORBIC ACID, VITAMIN C, ORAL 400 mg, oral, Daily RT    aspirin 81 mg    atorvastatin (Lipitor) 20 mg tablet 1 tablet, oral, Every Day    b complex vitamins capsule 1 capsule, oral, Every Day    BD Ultra-Fine Karis Pen Needle 32 gauge x 5/32\" needle use FOUR TIMES DAILY AS DIRECTED    calcipotriene 0.005 % ointment APPLY SPARINGLY TO AFFECTED AREA(S) ONCE DAILY ears    cholecalciferol, vitamin D3, 75 mcg (3,000 unit) tablet oral    clobetasoL 0.05 % lotion Clobex 0.05 % External Lotion   Refills: Mai De La Fuente MDh   Start : 29-Jan-2014   Active  59 ML Bottle    coenzyme Q-10 200 mg, oral, Every Day    fluocinolone (Derma-Smoothe) 0.01 % external oil apply EVERY DAY to affected area AS NEEDED    hydroCHLOROthiazide (HYDRODiuril) 12.5 mg tablet Date: 04/08/2021 14:39:00 EDT    insulin aspart (NOVOLOG) 45 Units, subcutaneous, Every Day, Daily for meal coverage    insulin degludec (TRESIBA FLEXTOUCH) 54 Units, subcutaneous, Nightly    lactobacillus (Culturelle) 10 billion cell capsule 1 capsule, oral, Daily    metoprolol succinate XL (TOPROL-XL) 50 mg, oral, Every Day    multivitamin with minerals (multivitamin with folic acid) tablet 1 tab daily V stack - one pill has vitamin c, vitamin D 2500 iu, zinc 15mg, quercetin 250mg. She takes just one.does not have calcium.    OneTouch Ultra Test strip USE 1 STRIP TO CHECK GLUCOSE THREE TIMES DAILY    pantoprazole (PROTONIX) 20 mg, oral, Every Day    SITagliptin phos-metformin (Janumet) 50-1,000 mg tablet 1 tablet, oral, 2 times daily with meals    valsartan (Diovan) 320 mg tablet 1 tablet, oral, Every Day, On hold since hospital discharge on the 9th of march.    zinc acetate 25 mg     Allergies   Allergen Reactions    Exenatide Other and Nausea Only    Insulin Detemir Other    " "Ramipril Cough    Penicillins Rash     Objective   Renal function panel              Component  Ref Range & Units 4 d ago  (10/5/23) 5 mo ago  (4/21/23) 6 mo ago  (3/16/23) 8 mo ago  (1/25/23) 1 yr ago  (8/10/22) 1 yr ago  (5/11/22) 1 yr ago  (1/3/22)   Glucose  74 - 99 mg/dL 146 High  153 High  99 133 High  134 High  136 High  146 High    Sodium  136 - 145 mmol/L 140 142 141 141 137 141 138   Potassium  3.5 - 5.3 mmol/L 4.5 4.8 4.9 4.8 4.9 4.9 4.6   Chloride  98 - 107 mmol/L 104 105 104 102 100 103 100   Bicarbonate  21 - 32 mmol/L 27 29 28 29 27 30 28   Anion Gap  10 - 20 mmol/L 14 13 14 15 15 13 15   Urea Nitrogen  6 - 23 mg/dL 19 18 15 22 14 20 21   Creatinine  0.50 - 1.05 mg/dL 1.11 High  1.09 High  0.98 1.09 High  0.93 1.20 High  1.02   eGFR  >60 mL/min/1.73m*2 54 Low          Comment: Calculations of estimated GFR are performed using the 2021 CKD-EPI Study Refit equation without the race variable for the IDMS-Traceable creatinine methods.  https://jasn.asnjournals.org/content/early/2021/09/22/ASN.3695108267   Calcium  8.6 - 10.6 mg/dL 10.4 10.6 10.5 10.2 10.3 11.0 High  10.6   Phosphorus  2.5 - 4.9 mg/dL 3.6         Comment: The performance characteristics of phosphorus testing in heparinized plasma have been validated by the individual  laboratory site where testing is performed. Testing on heparinized plasma is not approved by the FDA; however, such approval is not necessary.   Albumin  3.4 - 5.0 g/dL 4.4 4.7 3.9 4.4   4.3         Physical ExamBP 132/58 (BP Location: Left arm, Patient Position: Sitting, BP Cuff Size: Large adult)   Pulse 72   Resp 18   Ht 1.575 m (5' 2\")   Wt 71.2 kg (157 lb)   BMI 28.72 kg/m²   Patient looks well and is in no obvious distress.  HEENT:   Normocephalic, no facial asymmetry  Eyes: Sclera and conjunctiva are clear.  Neck: No adenopathy cervical (Ant/post/lat), no Supraclavicular nodes.   Thyroid normal.  Carotid pulses normal, no carotid bruits.  Lungs : RR normal. " Clear to auscultation anterior, posterior and lateral. No rales, wheezes rhonchi or rubs. Good air exchange.  Heart: RRR. 1/6 ursb systolic murmur no radiation. No gallop, click or rub.  Abdomen: Bowel sounds normal, No bruits. No pulsatile mass. No hepatosplenomegaly, masses . Mildly bloated. Mild ruq and rmq tenderness but soft and no guarding. Liver is upper normal in size has fatty liver.no guarding.  Extremities: no upper extremity edema. No lower extremity edema.   Musculoskeletal: no synovitis of joints seen. No new deformity.  Neuro: CN 2-12 intact. Alert, appropriate.  Ambulates independently.  No gross motor deficit.   No tremors.  Psych: normal mood and affect.  Skin: No rash, bruising petechiae or jaundice.    Juanis was seen today for follow-up.  Diagnoses and all orders for this visit:  RUQ pain (Primary)  -     Hepatic function panel; Future  -     NM hepatobiliary w cholecystokinin; Future  -     Cancel: CT abdomen pelvis wo IV contrast; Future  -     POCT UA Automated manually resulted  -     CT abdomen and pelvis w oral contrast only; Future  Hyperlipidemia associated with type 2 diabetes mellitus (CMS/HCC)  -     Lipid panel; Future  Secondary hypertension  -     hydroCHLOROthiazide (HYDRODiuril) 12.5 mg tablet; Date: 04/08/2021 14:39:00 EDT  Gastroesophageal reflux disease without esophagitis  -     pantoprazole (ProtoNix) 20 mg EC tablet; Take 1 tablet (20 mg) by mouth once every day.  Visit for screening mammogram  -     BI mammo bilateral screening tomosynthesis; Future  Need for hepatitis C screening test  -     Hepatitis C antibody; Future  Influenza vaccination declined  Type 2 diabetes mellitus with hypoglycemia and coma, with long-term current use of insulin (CMS/HCC)  -     OneTouch Ultra Test strip; USE 1 STRIP TO CHECK GLUCOSE THREE TIMES DAILY  Broken foot, left, sequela  Hypercholesterolemia  -     Cholesterol, LDL Direct; Future  Hypertension associated with stage 3 chronic kidney  disease due to type 2 diabetes mellitus (CMS/HCC)  -     Cancel: CT abdomen pelvis wo IV contrast; Future  -     CT abdomen and pelvis w oral contrast only; Future  Diabetes mellitus due to underlying condition with stage 3a chronic kidney disease, with long-term current use of insulin (CMS/HCC)  Periumbilical abdominal pain  -     Cancel: CT abdomen pelvis wo IV contrast; Future  -     CT abdomen and pelvis w oral contrast only; Future  Chronic kidney disease, stage 3a (CMS/HCC)  Ckd without microalbuminuria  Her fall was more of a stumble due to the sandals she was wearing- she realizes she should not have been wearing. She tries to be careful.  She has not had ct imaging for this pain, will order.  She declines viral vaccinations, flu, covid. Not interested, understands risks. She is high risk for complications from these infections due to dm.    Hld-ldl goal is under 100 due for lipid an dheptiac to monitor statin effectiveness and use.  Plan:  Talk to Dr Stevenson about adding Jardiance to help kidneys as well as diabetes.  Also consider changing janumet to one of the injectable meds such as ozempic or trulicity.But would do the jardiance first, especially give the diabetic gastropathy.     Fasting lipid panel prior to seeing dr Stevenson.  Thyroid and vitamin d and b12 are due after the first of the year, unless dr stevenson wants to order them sooner.    Mammogram.    Schedule CT scan abdomen and pelvis. We will do this without IV contrast due to CKD.  If this does not give us an answer to the recurrent right sided abdominal pain then will plan on scheduling a   hida scan to check gallbladder function.    Fasting blood test this week.    Follow up in 3 months. If blood pressure does not improve consider adding amlodipine.  ---also swill order a bone density, not discussed at visit today given the foot fracture.. Will have staff call her to schedule. Last was april 2021 and normal but since foot broken will do one  earlier than usually would have after a normal dexa. Vit d was normal in January 37.  @[facility]@

## 2023-10-09 NOTE — PATIENT INSTRUCTIONS
Talk to Dr Stevenson about adding Jardiance to help kidneys as well as diabetes.  Also consider changing janumet to one of the injectable meds such as ozempic or trulicity.But would do the jardiance first, especially give the diabetic gastropathy.     Fasting lipid panel prior to seeing dr Stevenson.  Thyroid and vitamin d and b12 are due after the first of the year, unless dr stevenson wants to order them sooner.    Mammogram.    Schedule CT scan abdomen and pelvis. We will do this without IV contrast due to CKD.  If this does not give us an answer to the recurrent right sided abdominal pain then will plan on scheduling a   hida scan to check gallbladder function.    Fasting blood test this week.    Follow up in 3 months. If blood pressure does not improve consider adding amlodipine.

## 2023-10-10 ENCOUNTER — LAB (OUTPATIENT)
Dept: LAB | Facility: LAB | Age: 68
End: 2023-10-10
Payer: MEDICARE

## 2023-10-10 DIAGNOSIS — E78.00 HYPERCHOLESTEROLEMIA: ICD-10-CM

## 2023-10-10 DIAGNOSIS — Z11.59 NEED FOR HEPATITIS C SCREENING TEST: ICD-10-CM

## 2023-10-10 DIAGNOSIS — E11.69 HYPERLIPIDEMIA ASSOCIATED WITH TYPE 2 DIABETES MELLITUS (MULTI): ICD-10-CM

## 2023-10-10 DIAGNOSIS — E78.5 HYPERLIPIDEMIA ASSOCIATED WITH TYPE 2 DIABETES MELLITUS (MULTI): ICD-10-CM

## 2023-10-10 DIAGNOSIS — R10.11 RUQ PAIN: ICD-10-CM

## 2023-10-10 PROCEDURE — 80076 HEPATIC FUNCTION PANEL: CPT

## 2023-10-10 PROCEDURE — 36415 COLL VENOUS BLD VENIPUNCTURE: CPT

## 2023-10-10 PROCEDURE — 83721 ASSAY OF BLOOD LIPOPROTEIN: CPT

## 2023-10-10 PROCEDURE — 80061 LIPID PANEL: CPT

## 2023-10-10 PROCEDURE — 86803 HEPATITIS C AB TEST: CPT

## 2023-10-11 LAB
ALBUMIN SERPL BCP-MCNC: 4.5 G/DL (ref 3.4–5)
ALP SERPL-CCNC: 60 U/L (ref 33–136)
ALT SERPL W P-5'-P-CCNC: 27 U/L (ref 7–45)
AST SERPL W P-5'-P-CCNC: 23 U/L (ref 9–39)
BILIRUB DIRECT SERPL-MCNC: 0.1 MG/DL (ref 0–0.3)
BILIRUB SERPL-MCNC: 0.9 MG/DL (ref 0–1.2)
CHOLEST SERPL-MCNC: 158 MG/DL (ref 0–199)
CHOLESTEROL/HDL RATIO: 4
HCV AB SER QL: NONREACTIVE
HDLC SERPL-MCNC: 39.4 MG/DL
LDLC SERPL CALC-MCNC: 82 MG/DL (ref 140–190)
LDLC SERPL DIRECT ASSAY-MCNC: 100 MG/DL (ref 0–129)
NON HDL CHOLESTEROL: 119 MG/DL (ref 0–149)
PROT SERPL-MCNC: 7.4 G/DL (ref 6.4–8.2)
TRIGL SERPL-MCNC: 183 MG/DL (ref 0–149)
VLDL: 37 MG/DL (ref 0–40)

## 2023-10-13 ENCOUNTER — ANCILLARY PROCEDURE (OUTPATIENT)
Dept: RADIOLOGY | Facility: CLINIC | Age: 68
End: 2023-10-13
Payer: MEDICARE

## 2023-10-13 DIAGNOSIS — R14.0 ABDOMINAL BLOATING: Primary | ICD-10-CM

## 2023-10-13 DIAGNOSIS — I12.9 HYPERTENSION ASSOCIATED WITH STAGE 3 CHRONIC KIDNEY DISEASE DUE TO TYPE 2 DIABETES MELLITUS (MULTI): ICD-10-CM

## 2023-10-13 DIAGNOSIS — R10.33 PERIUMBILICAL ABDOMINAL PAIN: ICD-10-CM

## 2023-10-13 DIAGNOSIS — R10.11 RUQ PAIN: ICD-10-CM

## 2023-10-13 DIAGNOSIS — E11.22 HYPERTENSION ASSOCIATED WITH STAGE 3 CHRONIC KIDNEY DISEASE DUE TO TYPE 2 DIABETES MELLITUS (MULTI): ICD-10-CM

## 2023-10-13 DIAGNOSIS — N18.30 HYPERTENSION ASSOCIATED WITH STAGE 3 CHRONIC KIDNEY DISEASE DUE TO TYPE 2 DIABETES MELLITUS (MULTI): ICD-10-CM

## 2023-10-13 PROCEDURE — 74176 CT ABD & PELVIS W/O CONTRAST: CPT | Performed by: RADIOLOGY

## 2023-10-13 PROCEDURE — 74176 CT ABD & PELVIS W/O CONTRAST: CPT | Mod: ME

## 2023-10-14 ENCOUNTER — TELEPHONE (OUTPATIENT)
Dept: PRIMARY CARE | Facility: CLINIC | Age: 68
End: 2023-10-14
Payer: MEDICARE

## 2023-10-14 DIAGNOSIS — Z87.81 HISTORY OF FOOT FRACTURE: Primary | ICD-10-CM

## 2023-10-14 DIAGNOSIS — Z78.0 MENOPAUSE: ICD-10-CM

## 2023-10-14 PROBLEM — L98.9 SKIN ABNORMALITY: Status: RESOLVED | Noted: 2023-03-15 | Resolved: 2023-10-14

## 2023-10-14 PROBLEM — E66.3 OVERWEIGHT WITH BODY MASS INDEX (BMI) OF 29 TO 29.9 IN ADULT: Status: RESOLVED | Noted: 2023-03-15 | Resolved: 2023-10-14

## 2023-10-14 PROBLEM — E11.9 DIABETES MELLITUS (MULTI): Status: RESOLVED | Noted: 2023-02-14 | Resolved: 2023-10-14

## 2023-10-14 PROBLEM — R01.1 SYSTOLIC MURMUR: Status: ACTIVE | Noted: 2023-02-14

## 2023-10-14 ASSESSMENT — ENCOUNTER SYMPTOMS
DEPRESSION: 0
OCCASIONAL FEELINGS OF UNSTEADINESS: 0

## 2023-10-17 ENCOUNTER — PATIENT OUTREACH (OUTPATIENT)
Dept: PRIMARY CARE | Facility: CLINIC | Age: 68
End: 2023-10-17
Payer: MEDICARE

## 2023-10-17 DIAGNOSIS — E11.641 TYPE 2 DIABETES MELLITUS WITH HYPOGLYCEMIA AND COMA, WITH LONG-TERM CURRENT USE OF INSULIN (MULTI): ICD-10-CM

## 2023-10-17 DIAGNOSIS — E78.5 HYPERLIPIDEMIA ASSOCIATED WITH TYPE 2 DIABETES MELLITUS (MULTI): ICD-10-CM

## 2023-10-17 DIAGNOSIS — E11.69 HYPERLIPIDEMIA ASSOCIATED WITH TYPE 2 DIABETES MELLITUS (MULTI): ICD-10-CM

## 2023-10-17 DIAGNOSIS — Z79.4 TYPE 2 DIABETES MELLITUS WITH HYPOGLYCEMIA AND COMA, WITH LONG-TERM CURRENT USE OF INSULIN (MULTI): ICD-10-CM

## 2023-10-17 PROCEDURE — 99490 CHRNC CARE MGMT STAFF 1ST 20: CPT | Performed by: INTERNAL MEDICINE

## 2023-10-17 NOTE — PROGRESS NOTES
Chart review performed prior to [facility] Outreach. Patient returned my call. We discussed her recent office visit with Dr. Domingo and her appointment in November with Endocrinology along with her blood pressure and blood sugar. Patient states she has not been testing her blood pressure because she has had family in town and is out of her routine. She recently saw her orthopedic doctor who she is going to see again in December because her foot was not quite healed. No problems ambulating.    Patient had Dr. Domingo send in a refill for her OneTouch Test strips for testing her blood sugar levels, but the pharmacy did not get it. I tried to reorder it and it would not let me. I will talk to Dr. Domingo's nurse, Lynn about it.

## 2023-10-18 DIAGNOSIS — E11.641 TYPE 2 DIABETES MELLITUS WITH HYPOGLYCEMIA AND COMA, WITH LONG-TERM CURRENT USE OF INSULIN (MULTI): ICD-10-CM

## 2023-10-18 DIAGNOSIS — Z79.4 TYPE 2 DIABETES MELLITUS WITH HYPOGLYCEMIA AND COMA, WITH LONG-TERM CURRENT USE OF INSULIN (MULTI): ICD-10-CM

## 2023-10-18 RX ORDER — BLOOD SUGAR DIAGNOSTIC
STRIP MISCELLANEOUS
Qty: 100 STRIP | Refills: 3 | Status: SHIPPED | OUTPATIENT
Start: 2023-10-18

## 2023-10-27 ENCOUNTER — HOSPITAL ENCOUNTER (OUTPATIENT)
Dept: RADIOLOGY | Facility: HOSPITAL | Age: 68
Discharge: HOME | End: 2023-10-27
Payer: MEDICARE

## 2023-10-27 DIAGNOSIS — R10.11 RUQ PAIN: ICD-10-CM

## 2023-10-27 DIAGNOSIS — R10.33 PERIUMBILICAL ABDOMINAL PAIN: ICD-10-CM

## 2023-10-27 DIAGNOSIS — R14.0 ABDOMINAL BLOATING: ICD-10-CM

## 2023-10-27 PROCEDURE — 78227 HEPATOBIL SYST IMAGE W/DRUG: CPT | Performed by: NUCLEAR MEDICINE

## 2023-10-27 PROCEDURE — 78227 HEPATOBIL SYST IMAGE W/DRUG: CPT | Mod: MG

## 2023-10-27 PROCEDURE — 3430000001 HC RX 343 DIAGNOSTIC RADIOPHARMACEUTICALS: Performed by: INTERNAL MEDICINE

## 2023-10-27 PROCEDURE — A9537 TC99M MEBROFENIN: HCPCS | Performed by: INTERNAL MEDICINE

## 2023-10-27 RX ORDER — KIT FOR THE PREPARATION OF TECHNETIUM TC 99M MEBROFENIN 45 MG/10ML
5.9 INJECTION, POWDER, LYOPHILIZED, FOR SOLUTION INTRAVENOUS
Status: COMPLETED | OUTPATIENT
Start: 2023-10-27 | End: 2023-10-27

## 2023-10-27 RX ADMIN — KIT FOR THE PREPARATION OF TECHNETIUM TC 99M MEBROFENIN 5.9 MILLICURIE: 45 INJECTION, POWDER, LYOPHILIZED, FOR SOLUTION INTRAVENOUS at 09:59

## 2023-10-29 NOTE — RESULT ENCOUNTER NOTE
Please call the patient regarding her result.  The additional test for gallbladder function is normal. It is not her gallbladder or biliary tract causing the problem. Suggest following up with GI to look for other causes and if none found, to address treatment if felt to be diabetes related.

## 2023-11-01 NOTE — RESULT ENCOUNTER NOTE
Just wanted to be sure she was aware her CT abdomen and pelvis was ok.  It showed some plaque in her abdominal vessel/aorta, diverticulosis (not diverticulitis), and small hiatal hernia.These are chronic findings .

## 2023-11-02 NOTE — RESULT ENCOUNTER NOTE
Pt received KM's recommendations, and reports that she had her HIDA scan on 27 October 2023.    bronwyn

## 2023-11-16 ENCOUNTER — HOSPITAL ENCOUNTER (OUTPATIENT)
Dept: RADIOLOGY | Facility: CLINIC | Age: 68
Discharge: HOME | End: 2023-11-16
Payer: MEDICARE

## 2023-11-16 ENCOUNTER — ANCILLARY PROCEDURE (OUTPATIENT)
Dept: RADIOLOGY | Facility: CLINIC | Age: 68
End: 2023-11-16
Payer: MEDICARE

## 2023-11-16 DIAGNOSIS — Z78.0 MENOPAUSE: ICD-10-CM

## 2023-11-16 DIAGNOSIS — Z12.31 VISIT FOR SCREENING MAMMOGRAM: ICD-10-CM

## 2023-11-16 DIAGNOSIS — Z87.81 HISTORY OF FOOT FRACTURE: ICD-10-CM

## 2023-11-16 PROCEDURE — 77063 BREAST TOMOSYNTHESIS BI: CPT | Performed by: RADIOLOGY

## 2023-11-16 PROCEDURE — 77080 DXA BONE DENSITY AXIAL: CPT

## 2023-11-16 PROCEDURE — 77080 DXA BONE DENSITY AXIAL: CPT | Performed by: RADIOLOGY

## 2023-11-16 PROCEDURE — 77067 SCR MAMMO BI INCL CAD: CPT | Performed by: RADIOLOGY

## 2023-11-16 PROCEDURE — 77063 BREAST TOMOSYNTHESIS BI: CPT

## 2023-11-21 ENCOUNTER — PATIENT OUTREACH (OUTPATIENT)
Dept: PRIMARY CARE | Facility: CLINIC | Age: 68
End: 2023-11-21
Payer: MEDICARE

## 2023-11-21 DIAGNOSIS — E11.641 TYPE 2 DIABETES MELLITUS WITH HYPOGLYCEMIA AND COMA, WITH LONG-TERM CURRENT USE OF INSULIN (MULTI): ICD-10-CM

## 2023-11-21 DIAGNOSIS — I10 HYPERTENSION, UNSPECIFIED TYPE: ICD-10-CM

## 2023-11-21 DIAGNOSIS — Z79.4 TYPE 2 DIABETES MELLITUS WITH HYPOGLYCEMIA AND COMA, WITH LONG-TERM CURRENT USE OF INSULIN (MULTI): ICD-10-CM

## 2023-11-21 PROCEDURE — 99490 CHRNC CARE MGMT STAFF 1ST 20: CPT | Performed by: INTERNAL MEDICINE

## 2023-11-21 NOTE — PROGRESS NOTES
Chart reviewed prior to [facility] outreach. Patient states all is going well right now. She recently saw a new endocrinologist. He is not going to change her medications right now but would like her to start using her Dexcom again and monitoring her sugar levels. He is going to see her in March and will decide whether or not to change her medications at that time. She said before that visit he would like a thyroid test done.     I also discussed her hepatobiliary test she had done. Everything came back normal and she said that her right upper quadrant abdominal pain has improved by 95%. She said there is still a little discomfort but it is not bad. She is not in need of anything at this time. She knows to call me if she needs anything.

## 2023-12-28 ENCOUNTER — PATIENT OUTREACH (OUTPATIENT)
Dept: PRIMARY CARE | Facility: CLINIC | Age: 68
End: 2023-12-28
Payer: MEDICARE

## 2023-12-28 DIAGNOSIS — E11.641 TYPE 2 DIABETES MELLITUS WITH HYPOGLYCEMIA AND COMA, WITH LONG-TERM CURRENT USE OF INSULIN (MULTI): ICD-10-CM

## 2023-12-28 DIAGNOSIS — Z79.4 TYPE 2 DIABETES MELLITUS WITH HYPOGLYCEMIA AND COMA, WITH LONG-TERM CURRENT USE OF INSULIN (MULTI): ICD-10-CM

## 2023-12-28 DIAGNOSIS — I10 HYPERTENSION, UNSPECIFIED TYPE: ICD-10-CM

## 2023-12-28 PROCEDURE — 99490 CHRNC CARE MGMT STAFF 1ST 20: CPT | Performed by: INTERNAL MEDICINE

## 2023-12-28 NOTE — PROGRESS NOTES
Chart reviewed prior to [facility] outreach. Patient states everything is going well right now. She is not in need of anything. She said she is getting a new insurance plan starting January 1st. I will reach out to her mid January and get the new plan information so I can see if she will owe anything for the Chronic Care Management Program. If she does owe when she talks to me she would like to no longer be enrolled in the program.

## 2024-02-05 NOTE — PROGRESS NOTES
Subjective   Patient ID: Juanis Arboleda is a 68 y.o. female who presents for Follow-up (Patient is here for follow up visit, patient would like to discuss options to help with left leg discomfort.).  LAST VISIT PLAN 10/9/23  Diagnoses and all orders for this visit:  RUQ pain (Primary)  -     Hepatic function panel; Future  -     NM hepatobiliary w cholecystokinin; Future  -     Cancel: CT abdomen pelvis wo IV contrast; Future  -     POCT UA Automated manually resulted  -     CT abdomen and pelvis w oral contrast only; Future  Hyperlipidemia associated with type 2 diabetes mellitus (CMS/HCC)  -     Lipid panel; Future  Secondary hypertension  -     hydroCHLOROthiazide (HYDRODiuril) 12.5 mg tablet; Date: 04/08/2021 14:39:00 EDT  Gastroesophageal reflux disease without esophagitis  -     pantoprazole (ProtoNix) 20 mg EC tablet; Take 1 tablet (20 mg) by mouth once every day.  Visit for screening mammogram  -     BI mammo bilateral screening tomosynthesis; Future  Need for hepatitis C screening test  -     Hepatitis C antibody; Future  Influenza vaccination declined  Type 2 diabetes mellitus with hypoglycemia and coma, with long-term current use of insulin (CMS/HCC)  -     OneTouch Ultra Test strip; USE 1 STRIP TO CHECK GLUCOSE THREE TIMES DAILY  Broken foot, left, sequela  Hypercholesterolemia  -     Cholesterol, LDL Direct; Future  Hypertension associated with stage 3 chronic kidney disease due to type 2 diabetes mellitus (CMS/HCC)  -     Cancel: CT abdomen pelvis wo IV contrast; Future  -     CT abdomen and pelvis w oral contrast only; Future  Diabetes mellitus due to underlying condition with stage 3a chronic kidney disease, with long-term current use of insulin (CMS/HCC)  Periumbilical abdominal pain  -     Cancel: CT abdomen pelvis wo IV contrast; Future  -     CT abdomen and pelvis w oral contrast only; Future  Chronic kidney disease, stage 3a (CMS/HCC)  Ckd without microalbuminuria  Her fall was more of a  stumble due to the sandals she was wearing- she realizes she should not have been wearing. She tries to be careful.  She has not had ct imaging for this pain, will order.  She declines viral vaccinations, flu, covid. Not interested, understands risks. She is high risk for complications from these infections due to dm.    Hld-ldl goal is under 100 due for lipid an dheptiac to monitor statin effectiveness and use.  Plan:  Talk to Dr Stevenson about adding Jardiance to help kidneys as well as diabetes.  Also consider changing janumet to one of the injectable meds such as ozempic or trulicity.But would do the jardiance first, especially give the diabetic gastropathy.     Fasting lipid panel prior to seeing dr Stevenson.  Thyroid and vitamin d and b12 are due after the first of the year, unless dr stevenson wants to order them sooner.    Mammogram.    Schedule CT scan abdomen and pelvis. We will do this without IV contrast due to CKD.  If this does not give us an answer to the recurrent right sided abdominal pain then will plan on scheduling a   hida scan to check gallbladder function.    Fasting blood test this week.    Follow up in 3 months. If blood pressure does not improve consider adding amlodipine.  ---also swill order a bone density, not discussed at visit today given the foot fracture.. Will have staff call her to schedule. Last was april 2021 and normal but since foot broken will do one earlier than usually would have after a normal dexa. Vit d was normal in January 37.  @[email or web address]@  END INFO FROM PRIOR VISIT  HPI  Here for follow up on htn , coord care dm2 (new endo), abd pain, gerd, and has some orthopedic complaints.    New knee pain:  Kneeled on left knee and instant pain and now pain for a month.  2020 fell . Had steroid shot right knee for scuffed knee cap then and that all went away. Then developed pain and drop foot from a peroneal neuropathy left leg after that fall. Her drop foot is still there but partly  better-mainly confined to weak left first toes. Top part of left foot is numb since 2020. Maybe she had a little neuropathy in both feet pre 2020 but she is not sure. She has a little tingling in her right foot toes she attributes to neuropathy but no other issues with right leg.  she took 2 advil  to help pain to sleep  Tried 2 year old gabapentin 100 mg 2 and did not help.  Answered questions concerns about gabapentin    We discussed her original issues with her left peroneal nerve compressive neuropathy:  Initially was thought by orth was from her back but Had 3 shots in her back and no help pre peroneal nerve dx  Saw dr joe who did nct which dxd peroneal neuropathy and he sent her to dr nair to do the surgery for peroneal nerve.  Will not do nsaid due to creat and the last  couple times advil did not help her knee pain anyway    DM2 insulin  requiring:Did see Dr Mireles and Will be seeing dr stevenson  for follow up. Saw him in November and he wanted her to do a thyroid blood test. He said she could do it through us in case I had other labs. Will order.  That was her first new endo visit, November and he did not change anything as she could not show him many sugars but now will have her cgm data for him, his goal is A1c of 7.5 or less.she did ask him about sglt2i  and he plans to discuss that at the next visit (she is on januvia with metformin as well as insulin but sugars could be better).    All pain in abdomen went away, no cp palp sob resp c/o.  No leg swelling  No falls  No faint or dizzy    C/o left thumb catches.     Scribe Transcription:  Cardiac: No CP , palpitations, increased jacobo  Resp: No sob, wheezing, cough  Extremities: No leg or ankle swelling, no claudication  Neuro: No dizziness, syncope, blurred vision. No new headaches.   She needs refills on valsartan and atorvastatin. She is also seeing Dr. Stevenson and she wants thyroid blood work because she sees him in March.   We looked over the  metoprolol history:The first rx for metoprolol was 25 mg in 2018 by Dr. Newman which is her previous PCP. I increased it in October 2021.     Diabetes:Type II:Is taking medications regularly, denies side effects.  Denies hypoglycemia, polyuria, polydipsia.  No syncope or dizziness.No blurred vision.  Lab Results   Component Value Date    HGBA1C 8.0 (H) 10/05/2023    HGBA1C 8.0 05/06/2020     Lab Results   Component Value Date    LDLCALC 82 (L) 10/10/2023    CREATININE 1.00 02/07/2024         Lab Results   Component Value Date    GLUCOSE 96 02/07/2024    CALCIUM 10.3 02/07/2024     02/07/2024    K 4.4 02/07/2024    CO2 28 02/07/2024     02/07/2024    BUN 20 02/07/2024    CREATININE 1.00 02/07/2024      No results found for this or any previous visit (from the past 4464 hour(s)).      She has perineal neuropathy which is painful all the way down her leg from her hip to her ankle. It is on the front and back of her thigh and in her gluteal area. She has a pre-patellar bursitis but no fluid. She states it is painful while sitting and worse with palpation which started a month ago. She states she had some neuropathy in both feet prior to her injury in 2020 but it is much worse after. She currently has tingling and numbness in her toes.     She has not been checking her bp very often at home but when she does she has been getting 150s systolic.     She states her head pain has resolved. She denies any recent falls, dizziness, or fainting.     She reports catching in her left thumb.     She reports stress at home because her  is being treated for prostate cancer and has been having hot flashes. He’ll be starting radiotherapy for 45 treatments soon.     She states she went back to Dr. Weir who did in injection in her knee which helped. He then referred her to pain management and got 3 shots in her back which didn’t help. She then saw Dr. Rutledge who told her her back was fine. He also referred her for  "an EMG and then sent her to Dr. Klein who told her she had a compressed nerve. He told her it would take a year to get better which is was, and then she broke her ankle.    Scribe Attestation  By signing my name below, I, Mami Garcia   attest that this documentation has been prepared under the direction and in the presence of Megan Domingo MD.   Review of Systems  See hpi and scribe notes.    Current Outpatient Medications   Medication Instructions    ASCORBIC ACID, VITAMIN C, ORAL 400 mg, oral, Daily RT    aspirin 81 mg    atorvastatin (LIPITOR) 20 mg, oral, Every Day    b complex vitamins capsule 1 capsule, oral, Every Day    BD Ultra-Fine Krais Pen Needle 32 gauge x 5/32\" needle use FOUR TIMES DAILY AS DIRECTED    calcipotriene 0.005 % ointment APPLY SPARINGLY TO AFFECTED AREA(S) ONCE DAILY ears    cholecalciferol, vitamin D3, 75 mcg (3,000 unit) tablet oral    clobetasoL 0.05 % lotion Clobex 0.05 % External Lotion   Refills: John Ryan MD, [place]   Start : 29-Jan-2014   Active  59 ML Bottle    coenzyme Q-10 200 mg, oral, Every Day    diclofenac sodium (VOLTAREN) 2 g, Topical, 3 times daily    fluocinolone (Derma-Smoothe) 0.01 % external oil apply EVERY DAY to affected area AS NEEDED    gabapentin (NEURONTIN) 200 mg, oral, Nightly    hydroCHLOROthiazide (HYDRODiuril) 12.5 mg tablet Date: 04/08/2021 14:39:00 EDT    insulin aspart (NOVOLOG) 45 Units, subcutaneous, Every Day, Daily for meal coverage    insulin degludec (TRESIBA FLEXTOUCH) 54 Units, subcutaneous, Nightly    lactobacillus (Culturelle) 10 billion cell capsule 1 capsule, oral, Daily    metoprolol succinate XL (TOPROL-XL) 50 mg, oral, Every Day    multivitamin with minerals (multivitamin with folic acid) tablet 1 tablet, oral, Daily, 1 tab daily V stack - one pill has vitamin C 400 mg, vitamin D 2500 iu, zinc 15mg, quercetin 250mg. She takes one.    OneTouch Ultra Test strip USE 1 STRIP TO CHECK GLUCOSE THREE TIMES DAILY    pantoprazole " "(PROTONIX) 20 mg, oral, Every Day    SITagliptin phos-metformin (Janumet) 50-1,000 mg tablet 1 tablet, oral, 2 times daily with meals    valsartan (DIOVAN) 320 mg, oral, Every Day, On hold since hospital discharge on the 9th of march.    zinc acetate 25 mg     Allergies   Allergen Reactions    Exenatide Other and Nausea Only    Insulin Detemir Other    Ramipril Cough    Penicillins Rash     Objective   Lab Results   Component Value Date    WBC 8.6 10/05/2023    HGB 12.5 10/05/2023    HCT 40.3 10/05/2023     10/05/2023    CHOL 158 10/10/2023    TRIG 183 (H) 10/10/2023    HDL 39.4 10/10/2023    LDLDIRECT 100 10/10/2023    ALT 27 10/10/2023    AST 23 10/10/2023     02/07/2024    K 4.4 02/07/2024     02/07/2024    CREATININE 1.00 02/07/2024    BUN 20 02/07/2024    CO2 28 02/07/2024    TSH 2.37 02/07/2024    HGBA1C 8.0 (H) 10/05/2023     par  Physical ExamBP 142/68   Pulse 72   Ht 1.575 m (5' 2\")   Wt 72.3 kg (159 lb 8 oz)   BMI 29.17 kg/m²   On recheck 143/64 same as above essentially.  Her heart rate ranges from 57 to 90, and average 63.    Home bp's are often 140 and occ   Patient looks well and is in no obvious distress.  HEENT:   Normocephalic, no facial asymmetry  Eyes: Sclera and conjunctiva are clear.  Neck: No adenopathy cervical (Ant/post/lat), no Supraclavicular nodes.   Thyroid normal.   Carotid pulses normal, no carotid bruits.  Lungs : RR normal. Clear to auscultation anterior, posterior and lateral. No rales, wheezes rhonchi or rubs. Good air exchange.  Heart: RRR. 2/6 systolic  Murmur ursb, ulsb, gallop, click or rub.  Abdomen: No pulsatile mass. No hepatosplenomegaly, masses  vague discomfort left side abdomen but exam is normal. Soft, no guarding.  Vascular:  Posterior tibialis pulses within normal limits bilaterally.   Extremities: no upper extremity edema. No lower extremity edema.   Musculoskeletal: no synovitis of joints seen. No new deformity.  Neuro: CN 2-12 intact. Alert, " appropriate.   Ambulates independently.  No gross motor deficit.   No tremors.  Psych: normal mood and affect.  Skin: No rash, bruising petechiae or jaundice.    Mild thickening of infra patellar tendon left knee  No fluid in the bursa  No bruise  She has a large varicosity over the knee but not inflamed and that is not tender  Patella is stable  Has bony bump inferior both knees, anatomic variant, not inflamed      Juanis was seen today for follow-up.  Diagnoses and all orders for this visit:  Diabetes mellitus due to underlying condition with stage 3a chronic kidney disease, with long-term current use of insulin (CMS/[facility]) (Primary)  Hypertension, unspecified type  -     valsartan (Diovan) 320 mg tablet; Take 1 tablet (320 mg) by mouth once every day. refill  Hyperlipidemia associated with type 2 diabetes mellitus (CMS/[facility])  -     atorvastatin (Lipitor) 20 mg tablet; Take 1 tablet (20 mg) by mouth once every day.  Patellar tendinitis of left knee  -     diclofenac sodium (Voltaren) 1 % gel; Apply 2.25 inches (2 g) topically 3 times a day.  Sciatica of left side  -     gabapentin (Neurontin) 100 mg capsule; Take 2 capsules (200 mg) by mouth once daily at bedtime.  Chronic kidney disease, stage 3a (CMS/[facility])  -     Renal function panel; Future  Other hyperlipidemia  -     TSH with reflex to Free T4 if abnormal; Future  Injury of left peroneal nerve, subsequent encounter  Injury of left knee, initial encounter  Comments:  from kneeling    History of peroneal nerve compressive neuropathy s/p surgery for this.        If bp does not improve with metoprolol then drop back to 50 and add amlodipine.keep valsartan.  She had different arbs in the past and hydrochlorothiazide in the past according to old records  Will not add hydrochlorothiazide due to creat up and no edema.      See wrap up section  and see below for patient instructions and  additional treatment plan.     Non fasting blood test tomorrow, when you are 24  hours without any vitamins or supplements.  This includes kidney electrolytes and thyroid.  Defer A1c to Dr Estrella.    Increase metoprolol succinate ER  to 2 of the 50 mg tabs once per day in the morning.  Move valsartan to supper or evening , same dose.  Monitor bp : let me know how it is running in a couple of weeks. Message me.  We can prescribe a 100 mg tab once per day metoprolol succ if this is successful.  If not successful, we will plan on another med plan.    Voltaren gel 2 grams to left knee three times per day for at least a week , and if helping continue for 2 weeks.  Gabapentin new rx: 2 of the 100 mg at bedtime for 2 weeks.  Tylenol 1000 mg three times per day as needed.  If leg is still bothering you, to see Dr Pino.    Follow up in 3 months for blood pressure. ECG at that visit.

## 2024-02-06 ENCOUNTER — TELEPHONE (OUTPATIENT)
Dept: PRIMARY CARE | Facility: CLINIC | Age: 69
End: 2024-02-06

## 2024-02-06 ENCOUNTER — OFFICE VISIT (OUTPATIENT)
Dept: PRIMARY CARE | Facility: CLINIC | Age: 69
End: 2024-02-06
Payer: COMMERCIAL

## 2024-02-06 VITALS
HEIGHT: 62 IN | DIASTOLIC BLOOD PRESSURE: 68 MMHG | WEIGHT: 159.5 LBS | SYSTOLIC BLOOD PRESSURE: 142 MMHG | BODY MASS INDEX: 29.35 KG/M2 | HEART RATE: 72 BPM

## 2024-02-06 DIAGNOSIS — M54.32 SCIATICA OF LEFT SIDE: ICD-10-CM

## 2024-02-06 DIAGNOSIS — E78.49 OTHER HYPERLIPIDEMIA: ICD-10-CM

## 2024-02-06 DIAGNOSIS — N18.31 DIABETES MELLITUS DUE TO UNDERLYING CONDITION WITH STAGE 3A CHRONIC KIDNEY DISEASE, WITH LONG-TERM CURRENT USE OF INSULIN (MULTI): Primary | ICD-10-CM

## 2024-02-06 DIAGNOSIS — M76.52 PATELLAR TENDINITIS OF LEFT KNEE: ICD-10-CM

## 2024-02-06 DIAGNOSIS — S89.92XA INJURY OF LEFT KNEE, INITIAL ENCOUNTER: ICD-10-CM

## 2024-02-06 DIAGNOSIS — Z79.4 DIABETES MELLITUS DUE TO UNDERLYING CONDITION WITH STAGE 3A CHRONIC KIDNEY DISEASE, WITH LONG-TERM CURRENT USE OF INSULIN (MULTI): Primary | ICD-10-CM

## 2024-02-06 DIAGNOSIS — E08.22 DIABETES MELLITUS DUE TO UNDERLYING CONDITION WITH STAGE 3A CHRONIC KIDNEY DISEASE, WITH LONG-TERM CURRENT USE OF INSULIN (MULTI): Primary | ICD-10-CM

## 2024-02-06 DIAGNOSIS — E11.69 HYPERLIPIDEMIA ASSOCIATED WITH TYPE 2 DIABETES MELLITUS (MULTI): ICD-10-CM

## 2024-02-06 DIAGNOSIS — E78.5 HYPERLIPIDEMIA ASSOCIATED WITH TYPE 2 DIABETES MELLITUS (MULTI): ICD-10-CM

## 2024-02-06 DIAGNOSIS — I10 HYPERTENSION, UNSPECIFIED TYPE: ICD-10-CM

## 2024-02-06 DIAGNOSIS — N18.31 CHRONIC KIDNEY DISEASE, STAGE 3A (MULTI): ICD-10-CM

## 2024-02-06 DIAGNOSIS — S84.12XD INJURY OF LEFT PERONEAL NERVE, SUBSEQUENT ENCOUNTER: ICD-10-CM

## 2024-02-06 PROCEDURE — 3077F SYST BP >= 140 MM HG: CPT | Performed by: INTERNAL MEDICINE

## 2024-02-06 PROCEDURE — 1159F MED LIST DOCD IN RCRD: CPT | Performed by: INTERNAL MEDICINE

## 2024-02-06 PROCEDURE — 3078F DIAST BP <80 MM HG: CPT | Performed by: INTERNAL MEDICINE

## 2024-02-06 PROCEDURE — 1036F TOBACCO NON-USER: CPT | Performed by: INTERNAL MEDICINE

## 2024-02-06 PROCEDURE — 1125F AMNT PAIN NOTED PAIN PRSNT: CPT | Performed by: INTERNAL MEDICINE

## 2024-02-06 PROCEDURE — 1160F RVW MEDS BY RX/DR IN RCRD: CPT | Performed by: INTERNAL MEDICINE

## 2024-02-06 PROCEDURE — 99215 OFFICE O/P EST HI 40 MIN: CPT | Performed by: INTERNAL MEDICINE

## 2024-02-06 PROCEDURE — 4010F ACE/ARB THERAPY RXD/TAKEN: CPT | Performed by: INTERNAL MEDICINE

## 2024-02-06 RX ORDER — DICLOFENAC SODIUM 10 MG/G
2 GEL TOPICAL 3 TIMES DAILY
Qty: 100 G | Refills: 0 | Status: SHIPPED | OUTPATIENT
Start: 2024-02-06 | End: 2024-05-22 | Stop reason: SDUPTHER

## 2024-02-06 RX ORDER — ATORVASTATIN CALCIUM 20 MG/1
20 TABLET, FILM COATED ORAL
Qty: 90 TABLET | Refills: 2 | Status: SHIPPED | OUTPATIENT
Start: 2024-02-06 | End: 2024-05-22 | Stop reason: SDUPTHER

## 2024-02-06 RX ORDER — GABAPENTIN 100 MG/1
200 CAPSULE ORAL NIGHTLY
Qty: 60 CAPSULE | Refills: 0 | Status: SHIPPED | OUTPATIENT
Start: 2024-02-06 | End: 2024-04-15 | Stop reason: SDUPTHER

## 2024-02-06 RX ORDER — VALSARTAN 320 MG/1
320 TABLET ORAL
Qty: 90 TABLET | Refills: 2 | Status: SHIPPED | OUTPATIENT
Start: 2024-02-06 | End: 2024-05-22 | Stop reason: SDUPTHER

## 2024-02-06 ASSESSMENT — PATIENT HEALTH QUESTIONNAIRE - PHQ9
SUM OF ALL RESPONSES TO PHQ9 QUESTIONS 1 AND 2: 0
1. LITTLE INTEREST OR PLEASURE IN DOING THINGS: NOT AT ALL
2. FEELING DOWN, DEPRESSED OR HOPELESS: NOT AT ALL

## 2024-02-06 ASSESSMENT — PAIN SCALES - GENERAL: PAINLEVEL: 8

## 2024-02-06 NOTE — PATIENT INSTRUCTIONS
Non fasting blood test tomorrow, when you are 24 hours without any vitamins or supplements.  This includes kidney electrolytes and thyroid.  Defer A1c to Dr Estrella.    Increase metoprolol succinate ER  to 2 of the 50 mg tabs once per day in the morning.  Move valsartan to supper or evening , same dose.  Monitor bp : let me know how it is running in a couple of weeks. Message me.  We can prescribe a 100 mg tab once per day metoprolol succ if this is successful.  If not successful, we will plan on another med plan.    Voltaren gel 2 grams to left knee three times per day for at least a week , and if helping continue for 2 weeks.  Gabapentin new rx: 2 of the 100 mg at bedtime for 2 weeks.  Tylenol 1000 mg three times per day as needed.  If leg is still bothering you, to see Dr Pino.    Follow up in 3 months for blood pressure. ECG at that visit.

## 2024-02-06 NOTE — TELEPHONE ENCOUNTER
Patient ask for a 130 pm in case she has to accompany  to treatments. Put her on 5/22/24 at 1;30 pm.

## 2024-02-07 ENCOUNTER — LAB (OUTPATIENT)
Dept: LAB | Facility: LAB | Age: 69
End: 2024-02-07
Payer: COMMERCIAL

## 2024-02-07 DIAGNOSIS — E78.49 OTHER HYPERLIPIDEMIA: ICD-10-CM

## 2024-02-07 DIAGNOSIS — N18.31 CHRONIC KIDNEY DISEASE, STAGE 3A (MULTI): ICD-10-CM

## 2024-02-07 PROCEDURE — 36415 COLL VENOUS BLD VENIPUNCTURE: CPT

## 2024-02-07 PROCEDURE — 80069 RENAL FUNCTION PANEL: CPT

## 2024-02-07 PROCEDURE — 84443 ASSAY THYROID STIM HORMONE: CPT

## 2024-02-08 LAB
ALBUMIN SERPL BCP-MCNC: 4.7 G/DL (ref 3.4–5)
ANION GAP SERPL CALC-SCNC: 14 MMOL/L (ref 10–20)
BUN SERPL-MCNC: 20 MG/DL (ref 6–23)
CALCIUM SERPL-MCNC: 10.3 MG/DL (ref 8.6–10.6)
CHLORIDE SERPL-SCNC: 101 MMOL/L (ref 98–107)
CO2 SERPL-SCNC: 28 MMOL/L (ref 21–32)
CREAT SERPL-MCNC: 1 MG/DL (ref 0.5–1.05)
EGFRCR SERPLBLD CKD-EPI 2021: 61 ML/MIN/1.73M*2
GLUCOSE SERPL-MCNC: 96 MG/DL (ref 74–99)
PHOSPHATE SERPL-MCNC: 3.7 MG/DL (ref 2.5–4.9)
POTASSIUM SERPL-SCNC: 4.4 MMOL/L (ref 3.5–5.3)
SODIUM SERPL-SCNC: 139 MMOL/L (ref 136–145)
TSH SERPL-ACNC: 2.37 MIU/L (ref 0.44–3.98)

## 2024-03-14 ENCOUNTER — LAB (OUTPATIENT)
Dept: LAB | Facility: LAB | Age: 69
End: 2024-03-14
Payer: COMMERCIAL

## 2024-03-14 DIAGNOSIS — R53.1 WEAKNESS: Primary | ICD-10-CM

## 2024-03-14 PROCEDURE — 86140 C-REACTIVE PROTEIN: CPT

## 2024-03-14 PROCEDURE — 36415 COLL VENOUS BLD VENIPUNCTURE: CPT

## 2024-03-15 LAB — CRP SERPL-MCNC: <0.1 MG/DL

## 2024-03-20 ENCOUNTER — PATIENT MESSAGE (OUTPATIENT)
Dept: PRIMARY CARE | Facility: CLINIC | Age: 69
End: 2024-03-20
Payer: COMMERCIAL

## 2024-03-20 DIAGNOSIS — S84.12XD INJURY OF LEFT PERONEAL NERVE, SUBSEQUENT ENCOUNTER: ICD-10-CM

## 2024-03-20 DIAGNOSIS — R94.131 ABNORMAL EMG: ICD-10-CM

## 2024-03-20 DIAGNOSIS — R29.898 WEAKNESS OF LEFT LEG: Primary | ICD-10-CM

## 2024-03-20 NOTE — PROGRESS NOTES
Formatting of this note might be different from the original.  She has profound weakness of her left quadriceps. Dr. Drew performed an EMG and he also raised a question of whether not she has proximal diabetic neuropathy or diabetic amyotrophy affecting the left leg. She has footdrop on the left side as well. She has had the benefit of a peroneal nerve decompression. She has less than grade 3 strength of her quad on the left side. Today, we are going to fit her with a knee immobilizer. She is going to see her orthopedist regarding a HKAFO to support not only her foot and ankle, but also a drop lock hinge to support her quadriceps weakness. I am recommending an MRI of the left knee and left quadriceps area/ left hip to look for denervation markers of the quad. I do not see any fasciculations today of the area of the quadriceps and I do not see any fasciculations of the tongue. We were trying to facilitate a visit with the neurologist as well. Hopefully, we can help her from a functional perspective with bracing and hopefully if this is diabetic amyotrophy, it may improve with time.

## 2024-04-15 ENCOUNTER — PATIENT MESSAGE (OUTPATIENT)
Dept: PRIMARY CARE | Facility: CLINIC | Age: 69
End: 2024-04-15
Payer: COMMERCIAL

## 2024-04-15 DIAGNOSIS — M54.32 SCIATICA OF LEFT SIDE: ICD-10-CM

## 2024-04-15 RX ORDER — GABAPENTIN 100 MG/1
100 CAPSULE ORAL 3 TIMES DAILY
Qty: 270 CAPSULE | Refills: 1 | Status: SHIPPED | OUTPATIENT
Start: 2024-04-15 | End: 2024-05-22 | Stop reason: DRUGHIGH

## 2024-04-15 NOTE — TELEPHONE ENCOUNTER
From: Juanis Arboleda  To: Megan Domingo  Sent: 4/15/2024 11:42 AM EDT  Subject: Gabapentin 100mg    Hi Dr Domingo, I was wondering if you could please give me a refill of gabapentin for nerve pain in my leg. They have been working well and no longer making me tired.   I have been taking 1 in morning, 1 at bedtime and sometimes 1 during day, if not I take either Advil or Tylenol.   I do have appt with you on May 22. Still unable to walk w/o walker. Going to therapy on 4/17 and to a neurologist on 4/18 and to [id number] on 4/23 for a leg brace.   Thank you, please send to [facility].

## 2024-05-19 NOTE — PROGRESS NOTES
Patient ID: Juanis Arboleda is a 69 y.o. female who presents for Follow-up (pT HERE FOR FOLLOW UP AND EKG).  LAST VISIT PLAN FROM: 2/6/2024  Diagnoses and all orders for this visit:  Diabetes mellitus due to underlying condition with stage 3a chronic kidney disease, with long-term current use of insulin (CMS/[facility]) (Primary)  Hypertension, unspecified type  -     valsartan (Diovan) 320 mg tablet; Take 1 tablet (320 mg) by mouth once every day. refill  Hyperlipidemia associated with type 2 diabetes mellitus (CMS/[facility])  -     atorvastatin (Lipitor) 20 mg tablet; Take 1 tablet (20 mg) by mouth once every day.  Patellar tendinitis of left knee  -     diclofenac sodium (Voltaren) 1 % gel; Apply 2.25 inches (2 g) topically 3 times a day.  Sciatica of left side  -     gabapentin (Neurontin) 100 mg capsule; Take 2 capsules (200 mg) by mouth once daily at bedtime.  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  If bp does not improve with metoprolol then drop back to 50 and add amlodipine.keep valsartan.  She had different arbs in the past and hydrochlorothiazide in the past according to old records  Will not add hydrochlorothiazide due to creat up and no edema.  See wrap up section  and see below for patient instructions and  additional treatment plan.   Non fasting blood test tomorrow, when you are 24 hours without any vitamins or supplements.  This includes kidney electrolytes and thyroid.  Defer A1c to Dr Estrella.  Increase metoprolol succinate ER  to 2 of the 50 mg tabs once per day in the morning.  Move valsartan to supper or evening , same dose.  Monitor bp : let me know how it is running in a couple of weeks. Message  "me.  We can prescribe a 100 mg tab once per day metoprolol succ if this is successful.  If not successful, we will plan on another med plan.  Voltaren gel 2 grams to left knee three times per day for at least a week , and if helping continue for 2 weeks.  Gabapentin new rx: 2 of the 100 mg at bedtime for 2 weeks.  Tylenol 1000 mg three times per day as needed.  If leg is still bothering you, to see Dr Pino.    Follow up in 3 months for blood pressure. ECG at that visit.  END LAST VISIT PLAN  -------------------------------------------    HPI  Here for follow up on htn and coordination of care DM2, left leg pain and weakness seeing endo and also ortho.    DM2:Saw endo Dr Estrella march 2024 and A1c remains at 8.0 at that visit.  Sugars are improving she forgot her cgm, but her predicted A1c 7.4  No med changes at recent endo-it was her first visit.  She has diabetic gastropathy therefore glp1 not recommended.  He did not discuss adding an sglt2i  While sugars had been running high the past 90 days Her 14 days A1c \"average\" as 7.1-improving.    Left leg pain is not so bad today-however at one point it was a 15 and lately has been not nearly that level.  The patient had been experiencing issues with their leg, specifically the left quad which has atrophied. She has left leg weakness and she fell at [employer] recently. She is not even sure how it happened, was just walking and leg buckled. She did aggravate her left knee and it is improving but still bothering her.   The patient states she has been diagnosed with diabetic amyotrophy, a condition that causes pain, weakness, and muscle wasting, affecting the left leg. The patient states she has been told the specialist it could take months or even a couple years to resolve. She has been undergoing various tests and treatments, including EMGs and MRIs, to monitor the condition and rule out other potential causes. The patient states she has also been referred to a neuro " physical therapist to help manage the condition.  The patient's left quad has atrophied, and they have fallen several times in recent months. She has an MRI scheduled to view her lumbo-sacral plexus.   Note she also has a hx of sciatic and patellar tendonitis .    Metabolic work up/ The patient's DELIA was abnormal, but all tests for diseases that could cause this were negative.The patient had been taking a B complex supplement, but stopped due to concerns about high B12 levels.     We reviewed her med list.    Her B12 level was over 2000 twice and the last time I checked it was in January 2023 and when I’ve checked it it was always normal (over 400). She will send me a picture of her B12 label so I can see how much she was taking.   The patient's homocysteine level was slightly elevated,folate level was normal. The patient has been taking CoQ10 and gabapentin for pain management.     Scribe Transcription:  HTN  Cardiac: No CP , palpitations, increased jacobo  Resp: No sob, wheezing, cough  Extremities: No leg or ankle swelling, no claudication  Neuro: No dizziness, syncope, blurred vision. No new headaches.     Diabetes:Type II:Is taking medications regularly, denies side effects.  Denies polyuria, polydipsia.  No new numbness or paresthesias of extremities. Same left leg issue as noted above. No syncope or dizziness. No blurred vision. Saw endo, no sglt2i mentioned just yet. New endo. She has had a rare glucose of 60.  Lab Results   Component Value Date    HGBA1C 8.0 (H) 10/05/2023    HGBA1C 8.0 05/06/2020     Lab Results   Component Value Date    LDLCALC 82 (L) 10/10/2023    CREATININE 1.00 02/07/2024         Lab Results   Component Value Date    GLUCOSE 96 02/07/2024    CALCIUM 10.3 02/07/2024     02/07/2024    K 4.4 02/07/2024    CO2 28 02/07/2024     02/07/2024    BUN 20 02/07/2024    CREATININE 1.00 02/07/2024       We reviewed her med list.    Review of Systems  See ROS in HPI    Current Outpatient  "Medications   Medication Instructions    ASCORBIC ACID, VITAMIN C, ORAL 400 mg, oral, Daily RT    aspirin 81 mg    atorvastatin (LIPITOR) 20 mg, oral, Every Day    BD Ultra-Fine Karis Pen Needle 32 gauge x 5/32\" needle use FOUR TIMES DAILY AS DIRECTED    calcipotriene 0.005 % ointment APPLY SPARINGLY TO AFFECTED AREA(S) ONCE DAILY ears    cholecalciferol, vitamin D3, 75 mcg (3,000 unit) tablet oral    clobetasoL 0.05 % lotion Clobex 0.05 % External Lotion   Refills: 0        Donavan Ryan MD   Start : 29-Jan-2014   Active  59 ML Bottle    coenzyme Q-10 200 mg, oral, Every Day    diclofenac sodium (VOLTAREN) 2 g, Topical, 3 times daily PRN, To left knee If needed    fluocinolone (Derma-Smoothe) 0.01 % external oil apply EVERY DAY to affected area AS NEEDED    gabapentin (Neurontin) 100 mg capsule Take 1 in the am,  1 in the afternoon and 300 mg at night or as directed.    hydroCHLOROthiazide (Microzide) 12.5 mg tablet Date: 04/08/2021 14:39:00 EDT    insulin aspart (NOVOLOG) 45 Units, subcutaneous, Every Day, Daily for meal coverage    insulin degludec (TRESIBA FLEXTOUCH) 54 Units, subcutaneous, Nightly    metoprolol succinate XL (TOPROL-XL) 50 mg, oral, Every Day    multivitamin with minerals (multivitamin with folic acid) tablet 1 tablet, oral, Daily, 1 tab daily V stack - one pill has vitamin C 400 mg, vitamin D 2500 iu, zinc 15mg, quercetin 250mg. She takes one.    OneTouch Ultra Test strip USE 1 STRIP TO CHECK GLUCOSE THREE TIMES DAILY    pantoprazole (PROTONIX) 20 mg, oral, Every Day    SITagliptin phos-metformin (Janumet) 50-1,000 mg tablet 1 tablet, oral, 2 times daily (morning and late afternoon)    valsartan (DIOVAN) 320 mg, oral, Every Day, On hold since hospital discharge on the 9th of march.    zinc acetate 25 mg     Allergies   Allergen Reactions    Exenatide Other and Nausea Only    Insulin Detemir Other    Ramipril Cough    Penicillins Rash     Objective   Lab Results   Component Value Date    WBC 8.6 " "10/05/2023    HGB 12.5 10/05/2023    HCT 40.3 10/05/2023     10/05/2023    CHOL 158 10/10/2023    TRIG 183 (H) 10/10/2023    HDL 39.4 10/10/2023    LDLDIRECT 100 10/10/2023    ALT 27 10/10/2023    AST 23 10/10/2023     02/07/2024    K 4.4 02/07/2024     02/07/2024    CREATININE 1.00 02/07/2024    BUN 20 02/07/2024    CO2 28 02/07/2024    TSH 2.37 02/07/2024    HGBA1C 8.0 (H) 10/05/2023   Hgb A1c POC Entered On: 03/05/2024 15:22 EST   Performed On: 03/05/2024 15:22 EST by Melissa Groves   Hgb A1c POC   Hgb A1c POC : 8.0   Most recent Hemoglobin A1c : between 8.0% and 9.0%     Physical ExamBP 128/66 (BP Location: Left arm, Patient Position: Sitting, BP Cuff Size: Adult)   Pulse 64   Resp 18   Ht 1.588 m (5' 2.5\")   Wt 68.9 kg (152 lb)   BMI 27.36 kg/m²   Patient looks her usual self , is well appearing, and is in no obvious distress.  HEENT:   Normocephalic, no facial asymmetry  Eyes: Sclera and conjunctiva are clear.  Neck: No adenopathy cervical (Ant/post/lat), no Supraclavicular nodes.   Thyroid normal.   Carotid pulses normal, no carotid bruits.  Lungs : RR normal. Clear to auscultation anterior, posterior and lateral. No rales, wheezes rhonchi or rubs. Good air exchange.  Heart: RRR. No gallop, click or rub.2/6 systolic  Murmur ursb, ulsb,  Abdomen: Bowel sounds normal, No bruits. No pulsatile mass. No hepatosplenomegaly, masses or tenderness. Soft, no guarding.  Vascular:  Posterior tibialis pulses within normal limits bilaterally.   Extremities: no upper extremity edema. No lower extremity edema.   Neuro: CN 2-12 intact. Alert, appropriate.   Ambulates independently. limps  No gross motor deficit.   No tremors.  Psych: normal mood and affect.  Skin: No rash, bruising petechiae or jaundice.    Juanis was seen today for follow-up.  Diagnoses and all orders for this visit:  Primary hypertension (Primary)  Type 2 diabetes mellitus with diabetic neuropathy, with long-term current use of " insulin (Multi)  -     SITagliptin phos-metformin (Janumet) 50-1,000 mg tablet; Take 1 tablet by mouth 2 times daily (morning and late afternoon).  Injury of left peroneal nerve, subsequent encounter  Other chronic pain  Patellar tendinitis of left knee  -     diclofenac sodium (Voltaren) 1 % gel; Apply 2.25 inches (2 g) topically 3 times a day as needed (knee swelling and pain). To left knee If needed  Accelerated hypertension  -     metoprolol succinate XL (Toprol-XL) 50 mg 24 hr tablet; Take 1 tablet (50 mg) by mouth once every day.  Secondary hypertension  -     hydroCHLOROthiazide (Microzide) 12.5 mg tablet; Date: 04/08/2021 14:39:00 EDT  Hypertension, unspecified type  -     valsartan (Diovan) 320 mg tablet; Take 1 tablet (320 mg) by mouth once every day. On hold since hospital discharge on the 9th of march.  -     ECG 12 lead (Clinic Performed)  Sciatica of left side  -     gabapentin (Neurontin) 100 mg capsule; Take 1 in the am,  1 in the afternoon and 300 mg at night or as directed.  Hyperlipidemia associated with type 2 diabetes mellitus (Multi)  -     atorvastatin (Lipitor) 20 mg tablet; Take 1 tablet (20 mg) by mouth once every day.     Problem List Items Addressed This Visit       Chronic pain    HTN (hypertension) - Primary    Relevant Medications    hydroCHLOROthiazide (Microzide) 12.5 mg tablet    valsartan (Diovan) 320 mg tablet    Other Relevant Orders    ECG 12 lead (Clinic Performed) (Completed)    Hyperlipidemia associated with type 2 diabetes mellitus (Multi)    Relevant Medications    atorvastatin (Lipitor) 20 mg tablet    Left peroneal nerve injury    Type 2 diabetes mellitus with diabetic neuropathy (Multi)    Relevant Medications    SITagliptin phos-metformin (Janumet) 50-1,000 mg tablet     Other Visit Diagnoses       Patellar tendinitis of left knee        Relevant Medications    diclofenac sodium (Voltaren) 1 % gel    Accelerated hypertension        Relevant Medications    metoprolol  succinate XL (Toprol-XL) 50 mg 24 hr tablet    Sciatica of left side        Relevant Medications    gabapentin (Neurontin) 100 mg capsule          She needed several refills. Will increase gabapentin to improve pain. Short term nsaid for recent fall to left knee, some patellar tendonitis.  Reviewed NSAID risks: Vascular (venous, cardiac and cerebrovascular), gastrointestinal, increased bleeding, renal and elevated blood pressure.    ECG done with htn history and normal for her. NSR. Shows slow r wave progression vs remote anterior infarct. This is similar to 2021 ECG and echocardiogram done at that time was normal. No cardiac complaints.      See patient instructions in wrap up plan, orders and comments for treatment plan.  Patient Instructions:  Titrate gabapentin at night to control that pain, can still james e 100 mg am and afternoon but try 200 mg at night, then 300 mg if that is not working. Let me know what dose plan works and we can adjust prescription accordingly.    Some short term advil 200 mg-400 mg 2-3 times per day for a few days with food. Just short term. For left knee from fall.    Sent meds to [facility].    Send me a pic of the b complex you stopped to see how much b12 was in there-according to past levels, you do not need to take extra b12 at this point but it does need checked annually.    Appt  in 3-4 months for annual wellness/follow up-will let you know if you need any blood work prior to that visit.

## 2024-05-22 ENCOUNTER — PATIENT OUTREACH (OUTPATIENT)
Dept: PRIMARY CARE | Facility: CLINIC | Age: 69
End: 2024-05-22

## 2024-05-22 ENCOUNTER — OFFICE VISIT (OUTPATIENT)
Dept: PRIMARY CARE | Facility: CLINIC | Age: 69
End: 2024-05-22
Payer: COMMERCIAL

## 2024-05-22 VITALS
RESPIRATION RATE: 18 BRPM | BODY MASS INDEX: 26.93 KG/M2 | DIASTOLIC BLOOD PRESSURE: 66 MMHG | HEIGHT: 63 IN | HEART RATE: 64 BPM | SYSTOLIC BLOOD PRESSURE: 128 MMHG | WEIGHT: 152 LBS

## 2024-05-22 DIAGNOSIS — I10 ACCELERATED HYPERTENSION: ICD-10-CM

## 2024-05-22 DIAGNOSIS — E78.5 HYPERLIPIDEMIA ASSOCIATED WITH TYPE 2 DIABETES MELLITUS (MULTI): ICD-10-CM

## 2024-05-22 DIAGNOSIS — E11.69 HYPERLIPIDEMIA ASSOCIATED WITH TYPE 2 DIABETES MELLITUS (MULTI): ICD-10-CM

## 2024-05-22 DIAGNOSIS — Z79.4 TYPE 2 DIABETES MELLITUS WITH DIABETIC NEUROPATHY, WITH LONG-TERM CURRENT USE OF INSULIN (MULTI): ICD-10-CM

## 2024-05-22 DIAGNOSIS — I10 HYPERTENSION, UNSPECIFIED TYPE: ICD-10-CM

## 2024-05-22 DIAGNOSIS — I10 PRIMARY HYPERTENSION: ICD-10-CM

## 2024-05-22 DIAGNOSIS — E53.8 B12 DEFICIENCY: ICD-10-CM
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
## 2024-05-22 DIAGNOSIS — E11.44 DIABETIC AMYOTROPHY ASSOCIATED WITH TYPE 2 DIABETES MELLITUS (MULTI): ICD-10-CM

## 2024-05-22 DIAGNOSIS — I15.9 SECONDARY HYPERTENSION: ICD-10-CM

## 2024-05-22 DIAGNOSIS — I10 PRIMARY HYPERTENSION: Primary | ICD-10-CM

## 2024-05-22 DIAGNOSIS — E11.40 TYPE 2 DIABETES MELLITUS WITH DIABETIC NEUROPATHY, WITH LONG-TERM CURRENT USE OF INSULIN (MULTI): ICD-10-CM

## 2024-05-22 DIAGNOSIS — M25.562 LEFT KNEE PAIN, UNSPECIFIED CHRONICITY: ICD-10-CM

## 2024-05-22 DIAGNOSIS — G89.29 OTHER CHRONIC PAIN: ICD-10-CM

## 2024-05-22 DIAGNOSIS — R79.89 HIGH SERUM VITAMIN B12: ICD-10-CM

## 2024-05-22 DIAGNOSIS — M76.52 PATELLAR TENDINITIS OF LEFT KNEE: ICD-10-CM

## 2024-05-22 DIAGNOSIS — M54.32 SCIATICA OF LEFT SIDE: ICD-10-CM

## 2024-05-22 DIAGNOSIS — S84.12XD INJURY OF LEFT PERONEAL NERVE, SUBSEQUENT ENCOUNTER: ICD-10-CM

## 2024-05-22 PROCEDURE — 3078F DIAST BP <80 MM HG: CPT | Performed by: INTERNAL MEDICINE

## 2024-05-22 PROCEDURE — 1160F RVW MEDS BY RX/DR IN RCRD: CPT | Performed by: INTERNAL MEDICINE

## 2024-05-22 PROCEDURE — 1159F MED LIST DOCD IN RCRD: CPT | Performed by: INTERNAL MEDICINE

## 2024-05-22 PROCEDURE — 3074F SYST BP LT 130 MM HG: CPT | Performed by: INTERNAL MEDICINE

## 2024-05-22 PROCEDURE — 4010F ACE/ARB THERAPY RXD/TAKEN: CPT | Performed by: INTERNAL MEDICINE

## 2024-05-22 PROCEDURE — 99490 CHRNC CARE MGMT STAFF 1ST 20: CPT | Performed by: INTERNAL MEDICINE

## 2024-05-22 PROCEDURE — 1126F AMNT PAIN NOTED NONE PRSNT: CPT | Performed by: INTERNAL MEDICINE

## 2024-05-22 PROCEDURE — 93000 ELECTROCARDIOGRAM COMPLETE: CPT | Performed by: INTERNAL MEDICINE

## 2024-05-22 PROCEDURE — 99215 OFFICE O/P EST HI 40 MIN: CPT | Performed by: INTERNAL MEDICINE

## 2024-05-22 PROCEDURE — 1158F ADVNC CARE PLAN TLK DOCD: CPT | Performed by: INTERNAL MEDICINE

## 2024-05-22 PROCEDURE — 1123F ACP DISCUSS/DSCN MKR DOCD: CPT | Performed by: INTERNAL MEDICINE

## 2024-05-22 RX ORDER — SITAGLIPTIN AND METFORMIN HYDROCHLORIDE 1000; 50 MG/1; MG/1
1 TABLET, FILM COATED ORAL
Qty: 180 TABLET | Refills: 3 | Status: SHIPPED | OUTPATIENT
Start: 2024-05-22

## 2024-05-22 RX ORDER — OLMESARTAN MEDOXOMIL AND HYDROCHLOROTHIAZIDE 20/12.5 20; 12.5 MG/1; MG/1
TABLET ORAL
COMMUNITY
Start: 2013-09-14 | End: 2024-05-22 | Stop reason: WASHOUT

## 2024-05-22 RX ORDER — VALSARTAN 320 MG/1
320 TABLET ORAL
Qty: 90 TABLET | Refills: 2 | Status: SHIPPED | OUTPATIENT
Start: 2024-05-22

## 2024-05-22 RX ORDER — ATORVASTATIN CALCIUM 20 MG/1
20 TABLET, FILM COATED ORAL
Qty: 90 TABLET | Refills: 2 | Status: SHIPPED | OUTPATIENT
Start: 2024-05-22

## 2024-05-22 RX ORDER — GABAPENTIN 100 MG/1
CAPSULE ORAL
Qty: 270 CAPSULE | Refills: 1 | Status: SHIPPED | OUTPATIENT
Start: 2024-05-22

## 2024-05-22 RX ORDER — DICLOFENAC SODIUM 10 MG/G
2 GEL TOPICAL 3 TIMES DAILY PRN
Start: 2024-05-22

## 2024-05-22 RX ORDER — METOPROLOL SUCCINATE 50 MG/1
50 TABLET, EXTENDED RELEASE ORAL
Qty: 90 TABLET | Refills: 3 | Status: SHIPPED | OUTPATIENT
Start: 2024-05-22 | End: 2025-05-22

## 2024-05-22 RX ORDER — HYDROCHLOROTHIAZIDE 12.5 MG/1
TABLET ORAL
Qty: 90 TABLET | Refills: 2 | Status: SHIPPED | OUTPATIENT
Start: 2024-05-22

## 2024-05-22 ASSESSMENT — PATIENT HEALTH QUESTIONNAIRE - PHQ9
SUM OF ALL RESPONSES TO PHQ9 QUESTIONS 1 AND 2: 0
2. FEELING DOWN, DEPRESSED OR HOPELESS: NOT AT ALL
1. LITTLE INTEREST OR PLEASURE IN DOING THINGS: NOT AT ALL

## 2024-05-22 ASSESSMENT — PAIN SCALES - GENERAL: PAINLEVEL: 0-NO PAIN

## 2024-05-22 NOTE — PROGRESS NOTES
Chart reviewed prior to [facility] outreach. Patient has been having a hard time lately dealing with her left knee pain. She has been having pain in her left knee for several months now and has seen a neuromuscular specialist with [facility]. That neuromuscular specialist has diagnosed her with diabetic amyotrophy with the plan being symptomatic management and diabetes control. She saw Dr. Domingo today who told her to increase her gabapentin at night and let her know which dose helps the most. Patient is now using a rollator and will continue to. She is going to see a neuro physical therapist on Tuesday to see if that helps more than her physical therapy is currently. Nothing needed from me at this point.

## 2024-05-22 NOTE — PATIENT INSTRUCTIONS
Titrate gabapentin at night to control that pain, can still james e 100 mg am and afternoon but try 200 mg at night, then 300 mg if that is not working. Let me know what dose plan works and we can adjust prescription accordingly.    Some short term advil 200 mg-400 mg 2-3 times per day for a few days with food. Just short term. For left knee from fall.    Sent meds to [facility].    Send me a pic of the b complex you stopped to see how much b12 was in there-according to past levels, you do not need to take extra b12 at this point but it does need checked annually.    Appt  in 3-4 months for annual wellness/follow up-will let you know if you need any blood work prior to that visit.

## 2024-05-23 DIAGNOSIS — R79.89 HIGH SERUM VITAMIN B12: Primary | ICD-10-CM

## 2024-06-18 ENCOUNTER — PATIENT MESSAGE (OUTPATIENT)
Dept: PRIMARY CARE | Facility: CLINIC | Age: 69
End: 2024-06-18

## 2024-06-24 ENCOUNTER — PATIENT OUTREACH (OUTPATIENT)
Dept: PRIMARY CARE | Facility: CLINIC | Age: 69
End: 2024-06-24
Payer: COMMERCIAL

## 2024-06-24 ENCOUNTER — LAB (OUTPATIENT)
Dept: LAB | Facility: LAB | Age: 69
End: 2024-06-24
Payer: COMMERCIAL

## 2024-06-24 DIAGNOSIS — Z79.4 TYPE 2 DIABETES MELLITUS WITH DIABETIC NEUROPATHY, WITH LONG-TERM CURRENT USE OF INSULIN (MULTI): ICD-10-CM

## 2024-06-24 DIAGNOSIS — R79.89 HIGH SERUM VITAMIN B12: ICD-10-CM

## 2024-06-24 DIAGNOSIS — I10 PRIMARY HYPERTENSION: ICD-10-CM

## 2024-06-24 DIAGNOSIS — E11.40 TYPE 2 DIABETES MELLITUS WITH DIABETIC NEUROPATHY, WITH LONG-TERM CURRENT USE OF INSULIN (MULTI): ICD-10-CM

## 2024-06-24 LAB — VIT B12 SERPL-MCNC: >2000 PG/ML (ref 211–911)

## 2024-06-24 PROCEDURE — 36415 COLL VENOUS BLD VENIPUNCTURE: CPT

## 2024-06-24 PROCEDURE — 82607 VITAMIN B-12: CPT

## 2024-06-24 NOTE — PROGRESS NOTES
Chart reviewed prior to [facility] outreach. Patient states everything is going well right now and she feels like she is slowly improving. She is still having left knee pain and she has fallen a few times since her last appointment with Dr. Domingo. She is still in physical therapy and typically does pool therapy once a week and land therapy once a week. She feels like it is helping but it is just slow progress. She feels like her muscles are getting stronger but her knee still fannie sometimes. She sent a message to Dr. Domingo about her gabapentin and was waiting to hear back about the prescription for gabapentin. She says she would like to stay with the 100mg tablets of gabapentin so she can cut back if she starts feeling better and not needing as much. I will let Dr. Domingo know.

## 2024-07-08 DIAGNOSIS — R79.89 HIGH SERUM VITAMIN B12: Primary | ICD-10-CM

## 2024-07-09 NOTE — PATIENT COMMUNICATION
Spoke with Juanis and she stated:    She only takes the 3 tabs of 100mg gabapentin at bedtime.  She you just refilled and has no refills left  It has been working well for her      She is wondering about her B12 labs?  She went to her Endocrinologist today and her A1C was 7.0    Please advise.

## 2024-07-15 NOTE — PROGRESS NOTES
B12 is high over 2000 and she is not taking anything with b12 in it. Has a neuropathy. Will recheck in sept.

## 2024-07-22 DIAGNOSIS — K21.9 GASTROESOPHAGEAL REFLUX DISEASE WITHOUT ESOPHAGITIS: ICD-10-CM

## 2024-07-22 DIAGNOSIS — I15.9 SECONDARY HYPERTENSION: ICD-10-CM

## 2024-07-22 RX ORDER — HYDROCHLOROTHIAZIDE 12.5 MG/1
TABLET ORAL
Qty: 90 TABLET | Refills: 2 | Status: SHIPPED | OUTPATIENT
Start: 2024-07-22

## 2024-07-22 RX ORDER — PANTOPRAZOLE SODIUM 20 MG/1
20 TABLET, DELAYED RELEASE ORAL
Qty: 90 TABLET | Refills: 2 | Status: SHIPPED | OUTPATIENT
Start: 2024-07-22

## 2024-09-09 ENCOUNTER — LAB (OUTPATIENT)
Dept: LAB | Facility: LAB | Age: 69
End: 2024-09-09
Payer: COMMERCIAL

## 2024-09-09 DIAGNOSIS — R79.89 HIGH SERUM VITAMIN B12: ICD-10-CM

## 2024-09-09 PROCEDURE — 82607 VITAMIN B-12: CPT

## 2024-09-09 PROCEDURE — 36415 COLL VENOUS BLD VENIPUNCTURE: CPT

## 2024-09-10 LAB — VIT B12 SERPL-MCNC: 1673 PG/ML (ref 211–911)

## 2024-09-14 NOTE — PROGRESS NOTES
Subjective   Reason for Visit: Juanis Arboleda is an 69 y.o. female here for a Medicare Wellness Visit, Annual Physical and follow up of conditions.   Past Medical, Surgical, and Family History reviewed and updated in chart.  Reviewed all medications by prescribing practitioner or clinical pharmacist (such as prescriptions, OTCs, herbal therapies and supplements) and documented in the medical record.  LAST VISIT PLAN FROM: 05/22/2024  She needed several refills. Will increase gabapentin to improve pain. Short term nsaid for recent fall to left knee, some patellar tendonitis.  Reviewed NSAID risks: Vascular (venous, cardiac and cerebrovascular), gastrointestinal, increased bleeding, renal and elevated blood pressure.  ECG done with htn history and normal for her. NSR. Shows slow r wave progression vs remote anterior infarct. This is similar to 2021 ECG and echocardiogram done at that time was normal. No cardiac complaints.  See patient instructions in wrap up plan, orders and comments for treatment plan.  Patient Instructions:  Titrate gabapentin at night to control that pain, can still james e 100 mg am and afternoon but try 200 mg at night, then 300 mg if that is not working. Let me know what dose plan works and we can adjust prescription accordingly.  Some short term advil 200 mg-400 mg 2-3 times per day for a few days with food. Just short term. For left knee from fall.  Sent meds to [facility].  Send me a pic of the b complex you stopped to see how much b12 was in there-according to past levels, you do not need to take extra b12 at this point but it does need checked annually.  Appt  in 3-4 months for annual wellness/follow up-will let you know if you need any blood work prior to that visit.  END INFO FROM PRIOR VISIT     HPIhere for a Medicare Wellness Visit, Annual Physical and follow up of conditions.   Health maintenance items last completed:  Colonoscopy: 05/04/2023 ([place]). (See Objective)  EGD 05/04/2023  ([facility]). (See Objective)  Mammogram: 11/16/2023: IMPRESSION: No mammographic evidence of malignancy.  BI-RADS Category:  1 Negative.  Recommendation:  Routine Screening Mammogram in 1 Year.  Due 11/16/2024.  Bone Density: 11/16/2023; normal.  Due 11/16/2027.  Urine ACR (albumin creatinine ratio) or microalbumin: if HTN or DM2: Albumin in urine <7.0 on 09/20/2024   Pap: Total hysterectomy on 11/04/2016. Follows with GYN and had Pap last year (2023) and was told that she no longer has to have further Pap testing unless there is an issue.  Pelvic: Follows with GYN.    Breast exam in office: Today.  Vaccines due or not completed: Pneumococcal and zoster completed.  RSV never done.  TD due 06/17/2030.  Covid and influenza due Fall 2024.  Last Health Maintenance exam: 05/25/2023    Patient Care Team:  Megan Domingo MD as PCP - General Chad Mora as Care Manager (Case Management)  Nirav Estrella MD as Referring Physician (Endocrinology)  Echo Chambers PT as Referring Physician (Physical Therapy)  Larry Moore MD as Referring Physician (Neurology)  Cal Aguilar PA-C as Referring Physician (Orthopaedic Surgery)  Joe Dennis MD as Referring Physician (Gastroenterology)  Raymond Pena MD as Referring Physician (Obstetrics and Gynecology)  Wayne Doll DPM as Referring Physician (Podiatry)     I reviewed current and past Medical, Surgical and Family History, as well as allergies and updated the medical record.    I reviewed the questions and answers of the Medicare Wellness Visit form including screenings for depression, alcohol, and fall risk.    I reviewed medications from this prescribing practitioner, outside practitioners, clinical pharmacist and patient directed including OTCs, herbal therapies and supplements, as well as prescriptions. These are documented in the medical record.  Refills provided as required.    I discussed code status with the patient, and they  "understand the difference between full code and DNR.  Assuming full code.  We dos not have ACP documents on file.  Advised patient to complete required paperwork and return to the office for us to scan and put in her chart.  I discussed HCPOA and LW. Her first HCPOA is her , Tom Arboleda.    I reviewed and updated the patient's Care Team in the chart.    Hypertension:  BP today is 158/72 with HR 72.  Recheck by me was 125/69 with HR 64.  Patient reports at-home BP in the 130/70 range. Increased metoprolol ER to 100mg once per day.  Advised to call if heart rate drops below 55 bpm frequently.  Advised to continue monitoring BP once per week.  Of note, she will have BP checks done at her Neurology and Endocrinology appointments scheduled in the next 2 months.  Follow up on cardiovascular conditions:  Cardiac:   Chest pain?No  Palpitations? No  Increased jacobo?  No  Other:  Resp:   Shortness of breath?No  Wheezing No  Cough No  Other:  Extremities:   Leg or ankle swelling?No   Claudication?No  Other:  Neuro:  DizzinessNo  SyncopeNo   Blurred visionNo  New headaches No  Other:  Medications:   Current Outpatient Medications   Medication Instructions    aspirin 81 mg    atorvastatin (LIPITOR) 20 mg, oral, Every Day    BD Ultra-Fine Karis Pen Needle 32 gauge x 5/32\" needle use FOUR TIMES DAILY AS DIRECTED    cholecalciferol, vitamin D3, 75 mcg (3,000 unit) tablet oral    coenzyme Q-10 200 mg, oral, Every Day    fluocinolone (DermOtic) 0.01 % ear drops 1-2 DROPS EVERY DAY AS NEEDED    hydroCHLOROthiazide (Microzide) 12.5 mg tablet Date: 04/08/2021 14:39:00 EDT    insulin aspart (NOVOLOG) 45 Units, subcutaneous, Every Day, Daily for meal coverage    insulin degludec (TRESIBA FLEXTOUCH) 54 Units, subcutaneous, Nightly    lidocaine (Lidoderm) 5 % patch Apply 1 patch over 12 hours topically Daily Remove & discard patch within 12 hours or as directed by MD.    metoprolol succinate XL (TOPROL-XL) 100 mg, oral, Every Day    " "multivitamin with minerals (multivitamin with folic acid) tablet 1 tablet, oral, Daily, 1 tab daily V stack - one pill has vitamin C 400 mg, vitamin D 2500 iu, zinc 15mg, quercetin 250mg. She takes one.    OneTouch Ultra Test strip USE 1 STRIP TO CHECK GLUCOSE THREE TIMES DAILY    pantoprazole (PROTONIX) 20 mg, oral, Every Day    SITagliptin phos-metformin (Janumet) 50-1,000 mg tablet 1 tablet, oral, 2 times daily (morning and late afternoon)    valsartan (DIOVAN) 320 mg, oral, Every Day, On hold since hospital discharge on the 9th of march.    zinc acetate 25 mg      Taking them regularly.Yes  Side effects.No    Hyperlipidemia:  No myalgias, nausea, abdominal pain.    Meds: Taking regularly, no adverse effect.  Patient continues o Lipitor 20 mg daily.  Labs: Last LDL was 82 mg/dL on 10/10/2023  LDL goal: <99 mg/dL.     Diabetes:  Type II:  A1c done with Dr. Estrella was 7.0%.  Is taking medications regularly, denies side effects.  Denies hypoglycemia, polyuria, polydipsia.  No new numbness or paresthesias of extremities. No syncope or dizziness. No blurred vision.  Lab Results   Component Value Date    HGBA1C 8.0 (H) 10/05/2023    HGBA1C 8.0 05/06/2020     Lab Results   Component Value Date    LDLCALC 82 (L) 10/10/2023    CREATININE 1.00 02/07/2024      Lab Results   Component Value Date    GLUCOSE 96 02/07/2024    CALCIUM 10.3 02/07/2024     02/07/2024    K 4.4 02/07/2024    CO2 28 02/07/2024     02/07/2024    BUN 20 02/07/2024    CREATININE 1.00 02/07/2024      Encounter Date: 05/22/24   ECG 12 lead (Clinic Performed)    Narrative    Normal sinus rhythm.  Slow r wave progression V1-V4 possible old septal infarct similar to prior   ECG.  No acute abnormalities.      Falls:   Numbness and Tingling of Left Leg:foot weakness  She last fell in May and then again in July.  Patient reports numerous falls correlating with her ankle and knee just \"giving out\" without warning.  She has undergone imaging (MRI " lumbar spine showed mild lumbar spondylosis with worst at L4-L5 but without canal/foraminal disease and lumbosacral plexus with no abnormality and MRI of left knee). She has also had EMG and nerve conduction testing completed.  Recommended wearing a knee brace to help with buckling with lateral and medial support, velcro top and bottom and patella opening.  Of note, she does have a smaller quadriceps muscles on the left side.  Patient also wears a device on her left ankle that straps to her shoe with improvement in stability.  Provided patient with tip sheet for preventing falls at home.    GERD:  Patient is wondering if she should reduce her Protonix as she has taken it for a very long time.  She is asymptomatic and has a history of Archer's esophagus.  I do not have that pathology report.  Will have patient sign a release form today so we can obtain it (05/04/2023).  We discussed that she is taking a low dose.  Continue on Protonix 20 mg daily.    Elevated B12:  Last B12 check was 1673 on 09/09/2024.  Advised to stop B12 indefinitely.  Resume CO Q-10.      Diarrhea:  Patient reports intermittent diarrhea but states she has been eating a lot of cucumbers and tomatoes.  Recommended reducing in her diet to see if diarrhea improves.  Advised to call if symptoms persist.    We discussed recommended vaccines including RSV, Covid booster now with influenza vaccine in October 2024.    Order placed for mammogram that is due for health maintenance.    Orders placed for refills as required.    Orders placed for blood work as required.    Follow up 01/07/2025.    Review of Systems  All systems reviewed and are negative unless otherwise stated in HPI or noted in writing on the written   3  page history and review of systems document reviewed by me and  scanned in with this visit. This is scanned either to notes or to media, with today's date.    Objective   Colonoscopy 05/04/2023:    EGD 05/04/2023:    Latest Complete Lab     "Latest Reference Range & Units 09/09/24 11:52   Vitamin B12 211 - 911 pg/mL 1,673 (H)   (H): Data is abnormally highResults:      Chemistry    Lab Results   Component Value Date/Time     02/07/2024 1501    K 4.4 02/07/2024 1501     02/07/2024 1501    CO2 28 02/07/2024 1501    BUN 20 02/07/2024 1501    CREATININE 1.00 02/07/2024 1501    Lab Results   Component Value Date/Time    CALCIUM 10.3 02/07/2024 1501    ALKPHOS 60 10/10/2023 1046    AST 23 10/10/2023 1046    ALT 27 10/10/2023 1046    BILITOT 0.9 10/10/2023 1046           Lab Results   Component Value Date    WBC 8.6 10/05/2023    HGB 12.5 10/05/2023    HCT 40.3 10/05/2023    MCV 89 10/05/2023     10/05/2023      Lab Results   Component Value Date    HGBA1C 8.0 (H) 10/05/2023      Lab Results   Component Value Date    LDLCALC 82 (L) 10/10/2023      No results found for: \"ALBUR\", \"[id number]\"   Lab Results   Component Value Date    HHBCNVQD85 1,673 (H) 09/09/2024      Lab Results   Component Value Date    TSH 2.37 02/07/2024      No results found for: \"PSA\"   Lab Results   Component Value Date    CHOL 158 10/10/2023    CHOL 171 09/20/2022    CHOL 151 06/29/2021     Lab Results   Component Value Date    HDL 39.4 10/10/2023    HDL 34.6 (A) 09/20/2022    HDL 46.0 06/29/2021     Lab Results   Component Value Date    LDLCALC 82 (L) 10/10/2023     Lab Results   Component Value Date    TRIG 183 (H) 10/10/2023    TRIG 219 (H) 09/20/2022    TRIG 136 06/29/2021     === 10/13/23 ===  CT ABDOMEN AND PELVIS W ORAL CONTRAST ONLY  - Impression -  No acute abnormality.  Small hiatal hernia.  Sigmoid colon diverticulosis.  Signed by: Deanna Gimenez 10/16/2023 4:51 PM  Dictation workstation:   [id number]     Vitals:      6/5/2023     2:56 PM 10/9/2023     3:43 PM 2/6/2024    10:43 AM 5/22/2024     1:36 PM 5/22/2024     2:25 PM 9/18/2024    11:28 AM 9/18/2024     1:12 PM   Vitals   Systolic 170 132 142 142 128 158 125   Diastolic 75 58 68 56 66 72 69   Heart " "Rate  72 72 64  72 64   Resp  18  18  16    Height (in)  1.575 m (5' 2\") 1.575 m (5' 2\") 1.588 m (5' 2.5\")  1.575 m (5' 2\")    Weight (lb)  157 159.5 152  152    BMI  28.72 kg/m2 29.17 kg/m2 27.36 kg/m2  27.8 kg/m2    BSA (m2)  1.76 m2 1.78 m2 1.74 m2  1.74 m2      Physical Exam  General: Patient is known to me, looks well, is in usual state of health, with  no obvious distress.  Head: Normocephalic  Eyes: PERRL, EOMI, no scleral icterus or conjunctival abnormality  Ears: Normal canals and TM's  Oropharynx: Well hydrated mucosa. no lesions, erythema. palate moves well.  Neck: No masses, no cervical (A/P/ or Lateral) adenopathy. Thyroid not enlarged and no nodules. Carotid pulses normal and no bruits.  Chest: Normal AP diameter. No supraclavicular adenopathy.  Lungs: Clear to auscultation: no rales , wheezes, rhonchi, rubs, examined bilaterally, ant/post /lateral. RR normal.  Heart: RRR. No MGCR S1 S2 normal.  No murmur heard today; previous visits heard 2/6 systolic murmur.  Abdomen: BS normal, no bruits. No hepatosplenomegaly. No abdominal mass or tenderness. No distension.  Extremities: No upper extremity edema. No lower leg edema. No ischemia.   MSK: Patient is wearing a device on her left ankle that straps to her shoe.  Mild swelling of left knee.  Joints: No synovitis seen. No deformities.  Pulses: Normal posterior tibialis pulses, normal dorsalis pedis pulses. normal radial pulses. Normal femoral pulses without bruits.  Neuro: CN 2-12 intact . Alert, oriented, appropriate.  No focal weakness observed. No tremors. Ambulation is normal .  Psych: Mood and affect normal. No cognitive deficits noted during this exam. Alert, oriented, appropriate.  Skin: No rash, petechiae or excessive bruising.  Lymph: no SC axillary or inguinal adenopathy     Diabetic Foot Exam:  Skin normal.  No lesions.  DP and PT pulses within normal limits.  Callus: None  Deformities: She has a left foot drop (PMHx of peroneal nerve injury) " and left 1st toe drop.  Monofilament Testing: She has no sensation to light touch on the toes on the left foot but rest of left foot is WNL.  Right foot WNL.    Juanis was seen today for medicare annual wellness visit subsequent.  Diagnoses and all orders for this visit:  Encounter for subsequent annual wellness visit (AWV) in Medicare patient (Primary)  -     1 Year Follow Up In [facility] - PCP - Wellness Exam; Future  Annual visit for general adult medical examination with abnormal findings  -     1 Year Follow Up In [facility] - PCP - Wellness Exam; Future  Visit for screening mammogram  -     BI mammo bilateral screening tomosynthesis; Future  Hyperlipidemia associated with type 2 diabetes mellitus (Multi)  -     atorvastatin (Lipitor) 20 mg tablet; Take 1 tablet (20 mg) by mouth once every day.  Hypertension, unspecified type  -     valsartan (Diovan) 320 mg tablet; Take 1 tablet (320 mg) by mouth once every day. On hold since hospital discharge on the 9th of march.  Hypercholesterolemia  -     Cholesterol, LDL Direct; Future  Hyperlipidemia, unspecified hyperlipidemia type  -     Lipid panel; Future  -     Cholesterol, LDL Direct; Future  -     TSH with reflex to Free T4 if abnormal; Future  -     Hepatic function panel; Future  -     CK; Future  Type 2 diabetes mellitus with diabetic neuropathy, with long-term current use of insulin (Multi)  -     TSH with reflex to Free T4 if abnormal; Future  -     Albumin-Creatinine Ratio, Urine Random; Future  -     Basic metabolic panel; Future  -     Hemoglobin A1c; Future  -     CBC and Auto Differential; Future  -     Hepatic function panel; Future  Accelerated hypertension  -     metoprolol succinate XL (Toprol-XL) 100 mg 24 hr tablet; Take 1 tablet (100 mg) by mouth once every day.  Immunization counseling    Annual  history and physical completed including  ROS, PE.   Medicare wellness visit  completed including:  Immunizations,   Health  Maintenance items,  Discussion of advanced directives and code status, which is: full code no restrictions.  Fall risk and prevention addressed.  Functionality and pain were assessed.   Cancer screening testing was addressed as appropriate-see patient discussion/orders.   Medications were discussed and reviewed/reconciled and refill need assessed/handled.   Patient states taking their medication regularly and appropriately.  Also:   Depression screening PhQ-2 completed: neg  Alcohol misuse screen: neg    In addition to the wellness visit and screening, the above medical issues were addressed:  As well as results of testing completed since last visit.    Patient Instructions:  Blood pressure:  Increase metoprolol ER to 100mg once per day-call if problems and call if heart rate starts going below 55 bpm frequently.  Continue to monitor BP, once a week is ok.  Follow up in 3 months, dec or jan. BP checks to be done at neuro and endo the next 2 months.    Reception have her sign a release to get copy of pathology from EGD May 4 2023 [facility].    Fasting blood test in October.  Instructions for obtaining lab tests:   You do not need a paper requisition when obtaining tests at a  facility. No appointment is needed for lab tests.  For fasting blood tests:  Please do not consume calories for 10-12 hours prior to this blood test. It is ok to drink water, you should be hydrated.  Please do not take vitamins, supplements or thyroid medication before your blood is drawn this day.   Most lab results should be available for your review on the  My Chart portal within 48 hours. Please contact the office if you have not received results within 2 weeks of obtaining the test.    Stay off B12 indefinitely. Resume coq10. We will onitor b12 after first of the year.-lab orders for this are not in place at this time.    Cut back on tomatoes and cucumbers due to diarrhea. Call if issues.    Vaccines recommended:    RSV-one time only.    Flu  vaccine October.    Covid vaccinated.suggested.    Brace for left knee with lateral and medial support , velcro top and bottom and patella opening  -------------        Ways to Help Prevent Falls at Home    Quick Tips   · Ask for help if you need it. Most people want to help!   · Get up slowly after sitting or laying down   · Wear a medical alert device or keep cell phone in your pocket   · Use night lights, especially areas near a bathroom   · Keep the items you use often within reach on a small stool or end table   · Use an assistive device such as walker or cane, as directed by provider/physical therapy   · Use a non-slip mat and grab bars in your bathroom. Look for home health sections for best options     Other Areas to Focus On   · Exercise and nutrition: Regular exercise or taking a falls prevention class are great ways improve strength and balance. Don't forget to stay hydrated and bring a snack!   · Medicine side effects: Some medicines can make you sleepy or dizzy, which could cause a fall. Ask your healthcare provider about the side effects your medicines could cause. Be sure to let them know if you take any vitamins or supplements as well.   · Tripping hazards: Remove items you could trip on, such as loose mats, rugs, cords, and clutter. Wear closed toe shoes with rubber soles.   · Health and wellness: Get regular checkups with your healthcare provider, plus routine vision and hearing screenings. Talk with your healthcare provider about:   o Your medicines and the possible side effects - bring them in a bag if that is easier!   o Problems with balance or feeling dizzy   o Ways to promote bone health, such as Vitamin D and calcium supplements   o Questions or concerns about falling     *Ask your healthcare team if you have questions     [facility], 2022   Scribe Attestation  By signing my name below, Ruth DUMONT Scribe   attest that this documentation has been prepared under the direction  and in the presence of Megan Domingo MD.

## 2024-09-18 ENCOUNTER — APPOINTMENT (OUTPATIENT)
Dept: PRIMARY CARE | Facility: CLINIC | Age: 69
End: 2024-09-18
Payer: COMMERCIAL

## 2024-09-18 VITALS
WEIGHT: 152 LBS | DIASTOLIC BLOOD PRESSURE: 69 MMHG | RESPIRATION RATE: 16 BRPM | HEIGHT: 62 IN | BODY MASS INDEX: 27.97 KG/M2 | HEART RATE: 64 BPM | SYSTOLIC BLOOD PRESSURE: 125 MMHG

## 2024-09-18 DIAGNOSIS — Z79.899 MEDICATION DOSE INCREASED: ICD-10-CM

## 2024-09-18 DIAGNOSIS — Z00.00 ENCOUNTER FOR SUBSEQUENT ANNUAL WELLNESS VISIT (AWV) IN MEDICARE PATIENT: Primary | ICD-10-CM

## 2024-09-18 DIAGNOSIS — E78.5 HYPERLIPIDEMIA ASSOCIATED WITH TYPE 2 DIABETES MELLITUS (MULTI): ICD-10-CM

## 2024-09-18 DIAGNOSIS — M21.372 LEFT FOOT DROP: ICD-10-CM

## 2024-09-18 DIAGNOSIS — E11.40 TYPE 2 DIABETES MELLITUS WITH DIABETIC NEUROPATHY, WITH LONG-TERM CURRENT USE OF INSULIN: ICD-10-CM

## 2024-09-18 DIAGNOSIS — E78.00 HYPERCHOLESTEROLEMIA: ICD-10-CM

## 2024-09-18 DIAGNOSIS — Z12.31 VISIT FOR SCREENING MAMMOGRAM: ICD-10-CM

## 2024-09-18 DIAGNOSIS — Z71.85 IMMUNIZATION COUNSELING: ICD-10-CM

## 2024-09-18 DIAGNOSIS — E11.69 HYPERLIPIDEMIA ASSOCIATED WITH TYPE 2 DIABETES MELLITUS (MULTI): ICD-10-CM

## 2024-09-18 DIAGNOSIS — Z79.4 TYPE 2 DIABETES MELLITUS WITH DIABETIC NEUROPATHY, WITH LONG-TERM CURRENT USE OF INSULIN: ICD-10-CM

## 2024-09-18 DIAGNOSIS — I10 HYPERTENSION, UNSPECIFIED TYPE: ICD-10-CM

## 2024-09-18 DIAGNOSIS — E11.44 DIABETIC AMYOTROPHY ASSOCIATED WITH TYPE 2 DIABETES MELLITUS (MULTI): ICD-10-CM

## 2024-09-18 DIAGNOSIS — Z00.01 ANNUAL VISIT FOR GENERAL ADULT MEDICAL EXAMINATION WITH ABNORMAL FINDINGS: ICD-10-CM

## 2024-09-18 DIAGNOSIS — I10 ACCELERATED HYPERTENSION: ICD-10-CM

## 2024-09-18 DIAGNOSIS — E78.5 HYPERLIPIDEMIA, UNSPECIFIED HYPERLIPIDEMIA TYPE: ICD-10-CM

## 2024-09-18 PROBLEM — M25.562 ACUTE PAIN OF LEFT KNEE: Status: RESOLVED | Noted: 2024-04-04 | Resolved: 2024-09-18

## 2024-09-18 PROBLEM — M16.12 PRIMARY OSTEOARTHRITIS OF LEFT HIP: Status: ACTIVE | Noted: 2024-04-04

## 2024-09-18 PROBLEM — E11.65 TYPE 2 DIABETES MELLITUS WITH HYPERGLYCEMIA (MULTI): Status: ACTIVE | Noted: 2024-03-05

## 2024-09-18 PROBLEM — K31.84 GASTROPARESIS: Status: ACTIVE | Noted: 2024-03-05

## 2024-09-18 PROCEDURE — 99213 OFFICE O/P EST LOW 20 MIN: CPT | Performed by: INTERNAL MEDICINE

## 2024-09-18 PROCEDURE — 4010F ACE/ARB THERAPY RXD/TAKEN: CPT | Performed by: INTERNAL MEDICINE

## 2024-09-18 PROCEDURE — 1158F ADVNC CARE PLAN TLK DOCD: CPT | Performed by: INTERNAL MEDICINE

## 2024-09-18 PROCEDURE — G0439 PPPS, SUBSEQ VISIT: HCPCS | Performed by: INTERNAL MEDICINE

## 2024-09-18 PROCEDURE — 3074F SYST BP LT 130 MM HG: CPT | Performed by: INTERNAL MEDICINE

## 2024-09-18 PROCEDURE — 1126F AMNT PAIN NOTED NONE PRSNT: CPT | Performed by: INTERNAL MEDICINE

## 2024-09-18 PROCEDURE — 99397 PER PM REEVAL EST PAT 65+ YR: CPT | Performed by: INTERNAL MEDICINE

## 2024-09-18 PROCEDURE — 3008F BODY MASS INDEX DOCD: CPT | Performed by: INTERNAL MEDICINE

## 2024-09-18 PROCEDURE — 1159F MED LIST DOCD IN RCRD: CPT | Performed by: INTERNAL MEDICINE

## 2024-09-18 PROCEDURE — 1170F FXNL STATUS ASSESSED: CPT | Performed by: INTERNAL MEDICINE

## 2024-09-18 PROCEDURE — 1160F RVW MEDS BY RX/DR IN RCRD: CPT | Performed by: INTERNAL MEDICINE

## 2024-09-18 PROCEDURE — 3078F DIAST BP <80 MM HG: CPT | Performed by: INTERNAL MEDICINE

## 2024-09-18 PROCEDURE — 1123F ACP DISCUSS/DSCN MKR DOCD: CPT | Performed by: INTERNAL MEDICINE

## 2024-09-18 RX ORDER — LIDOCAINE 50 MG/G
PATCH TOPICAL
COMMUNITY
Start: 2024-08-01

## 2024-09-18 RX ORDER — METOPROLOL SUCCINATE 100 MG/1
100 TABLET, EXTENDED RELEASE ORAL
Qty: 90 TABLET | Refills: 1 | Status: SHIPPED | OUTPATIENT
Start: 2024-09-18

## 2024-09-18 RX ORDER — VALSARTAN 320 MG/1
320 TABLET ORAL
Qty: 90 TABLET | Refills: 2 | Status: SHIPPED | OUTPATIENT
Start: 2024-09-18

## 2024-09-18 RX ORDER — ATORVASTATIN CALCIUM 20 MG/1
20 TABLET, FILM COATED ORAL
Qty: 90 TABLET | Refills: 3 | Status: SHIPPED | OUTPATIENT
Start: 2024-09-18

## 2024-09-18 RX ORDER — FLUOCINOLONE ACETONIDE 0.11 MG/ML
OIL AURICULAR (OTIC)
COMMUNITY
Start: 2024-08-23

## 2024-09-18 ASSESSMENT — ACTIVITIES OF DAILY LIVING (ADL)
JUDGMENT_ADEQUATE_SAFELY_COMPLETE_DAILY_ACTIVITIES: YES
PATIENT'S MEMORY ADEQUATE TO SAFELY COMPLETE DAILY ACTIVITIES?: YES
BATHING: INDEPENDENT
ADEQUATE_TO_COMPLETE_ADL: YES
GROCERY SHOPPING: INDEPENDENT
HEARING - LEFT EAR: FUNCTIONAL
PILL BOX USED: YES
TOILETING: INDEPENDENT
GROOMING: INDEPENDENT
STIL DRIVING: YES
NEEDS ASSISTANCE WITH FOOD: INDEPENDENT
FEEDING YOURSELF: INDEPENDENT
PREPARING MEALS: INDEPENDENT
EATING: INDEPENDENT
DRESSING YOURSELF: INDEPENDENT
WALKS IN HOME: INDEPENDENT
TAKING MEDICATION: INDEPENDENT
HEARING - RIGHT EAR: FUNCTIONAL
USING TELEPHONE: INDEPENDENT
USING TRANSPORTATION: INDEPENDENT
MANAGING FINANCES: INDEPENDENT
DOING HOUSEWORK: INDEPENDENT

## 2024-09-18 ASSESSMENT — ANXIETY QUESTIONNAIRES

## 2024-09-18 ASSESSMENT — PATIENT HEALTH QUESTIONNAIRE - PHQ9
2. FEELING DOWN, DEPRESSED OR HOPELESS: NOT AT ALL
SUM OF ALL RESPONSES TO PHQ9 QUESTIONS 1 AND 2: 0
1. LITTLE INTEREST OR PLEASURE IN DOING THINGS: NOT AT ALL

## 2024-09-18 ASSESSMENT — COLUMBIA-SUICIDE SEVERITY RATING SCALE - C-SSRS
6. HAVE YOU EVER DONE ANYTHING, STARTED TO DO ANYTHING, OR PREPARED TO DO ANYTHING TO END YOUR LIFE?: NO
1. IN THE PAST MONTH, HAVE YOU WISHED YOU WERE DEAD OR WISHED YOU COULD GO TO SLEEP AND NOT WAKE UP?: NO
2. HAVE YOU ACTUALLY HAD ANY THOUGHTS OF KILLING YOURSELF?: NO

## 2024-09-18 ASSESSMENT — PAIN SCALES - GENERAL: PAINLEVEL: 0-NO PAIN

## 2024-09-18 NOTE — PATIENT INSTRUCTIONS
Blood pressure:  Increase metoprolol ER to 100mg once per day-call if problems and call if heart rate starts going below 55 bpm frequently.  Continue to monitor BP, once a week is ok.  Follow up in 3 months, dec or jan. BP checks to be done at neuro and endo the next 2 months.    Reception have her sign a release to get copy of pathology from EGD May 4 2023 [facility].    Fasting blood test in October.  Instructions for obtaining lab tests:   You do not need a paper requisition when obtaining tests at a  facility. No appointment is needed for lab tests.  For fasting blood tests:  Please do not consume calories for 10-12 hours prior to this blood test. It is ok to drink water, you should be hydrated.  Please do not take vitamins, supplements or thyroid medication before your blood is drawn this day.   Most lab results should be available for your review on the  My Chart portal within 48 hours. Please contact the office if you have not received results within 2 weeks of obtaining the test.    Stay off B12 indefinitely. Resume coq10. We will onitor b12 after first of the year.-lab orders for this are not in place at this time.    Cut back on tomatoes and cucumbers due to diarrhea. Call if issues.    Vaccines recommended:    RSV-one time only.    Flu vaccine October.    Covid vaccinated.suggested.    Brace for left knee with lateral and medial support , velcro top and bottom and patella opening  -------------        Ways to Help Prevent Falls at Home    Quick Tips   · Ask for help if you need it. Most people want to help!   · Get up slowly after sitting or laying down   · Wear a medical alert device or keep cell phone in your pocket   · Use night lights, especially areas near a bathroom   · Keep the items you use often within reach on a small stool or end table   · Use an assistive device such as walker or cane, as directed by provider/physical therapy   · Use a non-slip mat and grab bars in your bathroom. Look for home  health sections for best options     Other Areas to Focus On   · Exercise and nutrition: Regular exercise or taking a falls prevention class are great ways improve strength and balance. Don't forget to stay hydrated and bring a snack!   · Medicine side effects: Some medicines can make you sleepy or dizzy, which could cause a fall. Ask your healthcare provider about the side effects your medicines could cause. Be sure to let them know if you take any vitamins or supplements as well.   · Tripping hazards: Remove items you could trip on, such as loose mats, rugs, cords, and clutter. Wear closed toe shoes with rubber soles.   · Health and wellness: Get regular checkups with your healthcare provider, plus routine vision and hearing screenings. Talk with your healthcare provider about:   o Your medicines and the possible side effects - bring them in a bag if that is easier!   o Problems with balance or feeling dizzy   o Ways to promote bone health, such as Vitamin D and calcium supplements   o Questions or concerns about falling     *Ask your healthcare team if you have questions     ©[facility], 2022

## 2024-09-23 ENCOUNTER — DOCUMENTATION (OUTPATIENT)
Dept: PRIMARY CARE | Facility: CLINIC | Age: 69
End: 2024-09-23
Payer: COMMERCIAL

## 2024-09-23 NOTE — PROGRESS NOTES
I talked to patient about graduating from program. She agreed and said she would call if she would like to be enrolled again in the future.

## 2024-10-09 DIAGNOSIS — E53.8 B12 DEFICIENCY: Primary | ICD-10-CM

## 2024-10-09 DIAGNOSIS — R79.89 HIGH SERUM VITAMIN B12: ICD-10-CM

## 2024-10-09 DIAGNOSIS — E78.5 HYPERLIPIDEMIA, UNSPECIFIED HYPERLIPIDEMIA TYPE: ICD-10-CM

## 2024-11-05 ENCOUNTER — LAB (OUTPATIENT)
Dept: LAB | Facility: LAB | Age: 69
End: 2024-11-05
Payer: COMMERCIAL

## 2024-11-05 DIAGNOSIS — E78.00 HYPERCHOLESTEROLEMIA: ICD-10-CM

## 2024-11-05 DIAGNOSIS — E78.5 HYPERLIPIDEMIA, UNSPECIFIED HYPERLIPIDEMIA TYPE: ICD-10-CM

## 2024-11-05 DIAGNOSIS — Z79.4 TYPE 2 DIABETES MELLITUS WITH DIABETIC NEUROPATHY, WITH LONG-TERM CURRENT USE OF INSULIN: ICD-10-CM

## 2024-11-05 DIAGNOSIS — E11.40 TYPE 2 DIABETES MELLITUS WITH DIABETIC NEUROPATHY, WITH LONG-TERM CURRENT USE OF INSULIN: ICD-10-CM

## 2024-11-05 LAB
ALBUMIN SERPL BCP-MCNC: 4.3 G/DL (ref 3.4–5)
ALP SERPL-CCNC: 63 U/L (ref 33–136)
ALT SERPL W P-5'-P-CCNC: 23 U/L (ref 7–45)
ANION GAP SERPL CALC-SCNC: 13 MMOL/L (ref 10–20)
AST SERPL W P-5'-P-CCNC: 18 U/L (ref 9–39)
BASOPHILS # BLD AUTO: 0.09 X10*3/UL (ref 0–0.1)
BASOPHILS NFR BLD AUTO: 1.1 %
BILIRUB DIRECT SERPL-MCNC: 0.2 MG/DL (ref 0–0.3)
BILIRUB SERPL-MCNC: 1 MG/DL (ref 0–1.2)
BUN SERPL-MCNC: 23 MG/DL (ref 6–23)
CALCIUM SERPL-MCNC: 9.9 MG/DL (ref 8.6–10.6)
CHLORIDE SERPL-SCNC: 102 MMOL/L (ref 98–107)
CHOLEST SERPL-MCNC: 142 MG/DL (ref 0–199)
CHOLESTEROL/HDL RATIO: 4
CK SERPL-CCNC: 188 U/L (ref 0–215)
CO2 SERPL-SCNC: 28 MMOL/L (ref 21–32)
CREAT SERPL-MCNC: 1.04 MG/DL (ref 0.5–1.05)
CREAT UR-MCNC: 94.6 MG/DL (ref 20–320)
EGFRCR SERPLBLD CKD-EPI 2021: 58 ML/MIN/1.73M*2
EOSINOPHIL # BLD AUTO: 0.3 X10*3/UL (ref 0–0.4)
EOSINOPHIL NFR BLD AUTO: 3.7 %
ERYTHROCYTE [DISTWIDTH] IN BLOOD BY AUTOMATED COUNT: 15.1 % (ref 11.5–14.5)
EST. AVERAGE GLUCOSE BLD GHB EST-MCNC: 192 MG/DL
GLUCOSE SERPL-MCNC: 205 MG/DL (ref 74–99)
HBA1C MFR BLD: 8.3 %
HCT VFR BLD AUTO: 39.5 % (ref 36–46)
HDLC SERPL-MCNC: 35.8 MG/DL
HGB BLD-MCNC: 12.4 G/DL (ref 12–16)
IMM GRANULOCYTES # BLD AUTO: 0.02 X10*3/UL (ref 0–0.5)
IMM GRANULOCYTES NFR BLD AUTO: 0.2 % (ref 0–0.9)
LDLC SERPL CALC-MCNC: 69 MG/DL
LDLC SERPL DIRECT ASSAY-MCNC: 85 MG/DL (ref 0–129)
LYMPHOCYTES # BLD AUTO: 3.29 X10*3/UL (ref 0.8–3)
LYMPHOCYTES NFR BLD AUTO: 41 %
MCH RBC QN AUTO: 27.4 PG (ref 26–34)
MCHC RBC AUTO-ENTMCNC: 31.4 G/DL (ref 32–36)
MCV RBC AUTO: 87 FL (ref 80–100)
MICROALBUMIN UR-MCNC: <7 MG/L
MICROALBUMIN/CREAT UR: NORMAL MG/G{CREAT}
MONOCYTES # BLD AUTO: 0.6 X10*3/UL (ref 0.05–0.8)
MONOCYTES NFR BLD AUTO: 7.5 %
NEUTROPHILS # BLD AUTO: 3.73 X10*3/UL (ref 1.6–5.5)
NEUTROPHILS NFR BLD AUTO: 46.5 %
NON HDL CHOLESTEROL: 106 MG/DL (ref 0–149)
NRBC BLD-RTO: 0 /100 WBCS (ref 0–0)
PLATELET # BLD AUTO: 355 X10*3/UL (ref 150–450)
POTASSIUM SERPL-SCNC: 4.9 MMOL/L (ref 3.5–5.3)
PROT SERPL-MCNC: 6.9 G/DL (ref 6.4–8.2)
RBC # BLD AUTO: 4.52 X10*6/UL (ref 4–5.2)
SODIUM SERPL-SCNC: 138 MMOL/L (ref 136–145)
TRIGL SERPL-MCNC: 184 MG/DL (ref 0–149)
TSH SERPL-ACNC: 2.53 MIU/L (ref 0.44–3.98)
VLDL: 37 MG/DL (ref 0–40)
WBC # BLD AUTO: 8 X10*3/UL (ref 4.4–11.3)

## 2024-11-05 PROCEDURE — 82570 ASSAY OF URINE CREATININE: CPT

## 2024-11-05 PROCEDURE — 36415 COLL VENOUS BLD VENIPUNCTURE: CPT

## 2024-11-05 PROCEDURE — 82248 BILIRUBIN DIRECT: CPT

## 2024-11-05 PROCEDURE — 80061 LIPID PANEL: CPT

## 2024-11-05 PROCEDURE — 84443 ASSAY THYROID STIM HORMONE: CPT

## 2024-11-05 PROCEDURE — 82043 UR ALBUMIN QUANTITATIVE: CPT

## 2024-11-05 PROCEDURE — 83036 HEMOGLOBIN GLYCOSYLATED A1C: CPT

## 2024-11-05 PROCEDURE — 82550 ASSAY OF CK (CPK): CPT

## 2024-11-05 PROCEDURE — 83721 ASSAY OF BLOOD LIPOPROTEIN: CPT

## 2024-11-05 PROCEDURE — 80053 COMPREHEN METABOLIC PANEL: CPT

## 2024-11-05 PROCEDURE — 85025 COMPLETE CBC W/AUTO DIFF WBC: CPT

## 2024-11-05 NOTE — RESULT ENCOUNTER NOTE
Please call the patient regarding her result. A1c is 8.3, other labs are stable, just sugars high. Does she have an appt coming up with her endocrinologist? Believe she is seeing dr stevenson now.

## 2024-11-20 ENCOUNTER — HOSPITAL ENCOUNTER (OUTPATIENT)
Dept: RADIOLOGY | Facility: CLINIC | Age: 69
Discharge: HOME | End: 2024-11-20
Payer: COMMERCIAL

## 2024-11-20 VITALS — HEIGHT: 63 IN | BODY MASS INDEX: 27.46 KG/M2 | WEIGHT: 155 LBS

## 2024-11-20 DIAGNOSIS — Z12.31 VISIT FOR SCREENING MAMMOGRAM: ICD-10-CM

## 2024-11-20 PROCEDURE — 77063 BREAST TOMOSYNTHESIS BI: CPT | Performed by: RADIOLOGY

## 2024-11-20 PROCEDURE — 77067 SCR MAMMO BI INCL CAD: CPT | Performed by: RADIOLOGY

## 2024-11-20 PROCEDURE — 77067 SCR MAMMO BI INCL CAD: CPT

## 2024-12-29 ENCOUNTER — OFFICE VISIT (OUTPATIENT)
Dept: URGENT CARE | Age: 69
End: 2024-12-29
Payer: COMMERCIAL

## 2024-12-29 VITALS
BODY MASS INDEX: 27.46 KG/M2 | WEIGHT: 155 LBS | DIASTOLIC BLOOD PRESSURE: 72 MMHG | OXYGEN SATURATION: 97 % | HEART RATE: 80 BPM | RESPIRATION RATE: 16 BRPM | SYSTOLIC BLOOD PRESSURE: 132 MMHG | TEMPERATURE: 97.5 F

## 2024-12-29 DIAGNOSIS — J01.10 ACUTE NON-RECURRENT FRONTAL SINUSITIS: Primary | ICD-10-CM

## 2024-12-29 DIAGNOSIS — H10.32 ACUTE BACTERIAL CONJUNCTIVITIS OF LEFT EYE: ICD-10-CM

## 2024-12-29 PROCEDURE — 4010F ACE/ARB THERAPY RXD/TAKEN: CPT | Performed by: NURSE PRACTITIONER

## 2024-12-29 PROCEDURE — 3052F HG A1C>EQUAL 8.0%<EQUAL 9.0%: CPT | Performed by: NURSE PRACTITIONER

## 2024-12-29 PROCEDURE — 99203 OFFICE O/P NEW LOW 30 MIN: CPT | Performed by: NURSE PRACTITIONER

## 2024-12-29 PROCEDURE — 3075F SYST BP GE 130 - 139MM HG: CPT | Performed by: NURSE PRACTITIONER

## 2024-12-29 PROCEDURE — 1159F MED LIST DOCD IN RCRD: CPT | Performed by: NURSE PRACTITIONER

## 2024-12-29 PROCEDURE — 3062F POS MACROALBUMINURIA REV: CPT | Performed by: NURSE PRACTITIONER

## 2024-12-29 PROCEDURE — 3048F LDL-C <100 MG/DL: CPT | Performed by: NURSE PRACTITIONER

## 2024-12-29 PROCEDURE — 3078F DIAST BP <80 MM HG: CPT | Performed by: NURSE PRACTITIONER

## 2024-12-29 RX ORDER — TOBRAMYCIN 3 MG/ML
1 SOLUTION/ DROPS OPHTHALMIC EVERY 4 HOURS
Qty: 8 ML | Refills: 0 | Status: SHIPPED | OUTPATIENT
Start: 2024-12-29 | End: 2025-01-05

## 2024-12-29 RX ORDER — AZITHROMYCIN 250 MG/1
TABLET, FILM COATED ORAL
Qty: 6 TABLET | Refills: 0 | Status: SHIPPED | OUTPATIENT
Start: 2024-12-29 | End: 2025-01-03

## 2024-12-29 NOTE — PATIENT INSTRUCTIONS
Antibiotics were called into your pharmacy take as directed. You may use claritan or Zyrtec and flonase nasal spray for sinus congestion in conjunction with abx. Follow-up with your primary care doctor in the outpatient basis.    Place warm compresses (washcloth) over affected eye, dispose of daily. Use medications as prescribed. This is contagious, and will remain contagious until 24 hours on antibiotics. Wash hands frequently. Avoid touching the eyes. Change pillowcases daily so not to reinfect yourself. Discontinue contact lens use for at least 2 weeks, and do not resume until infection has cleared. Discard lens case, eyedrops, and disposable lenses. If lenses are not disposable, reuse only after overnight disinfection. Please see ophthalmologist in 5-7 days if symptoms persist. For worsening pain or blurred vision, please go to the ER.

## 2024-12-29 NOTE — PROGRESS NOTES
Subjective   Patient ID: Juanis Arboleda is a 69 y.o. female. They present today with a chief complaint of Eye Problem (Redness, discharge, runny nose, started last night).    History of Present Illness  HPI  Pt is a 69 yr old female who presents with sinus congestion and eye redness with drainage for 3 days.  She denies fever or chills but did develop a cough also.  She Refused covid testing at this time  Past Medical History  Allergies as of 12/29/2024 - Reviewed 09/18/2024   Allergen Reaction Noted    Exenatide Nausea Only and Other 03/15/2023    Insulin detemir Other 03/15/2023    Ramipril Cough 03/15/2023    Penicillins Rash 11/[birth year]/1960       (Not in a hospital admission)         Past Medical History:   Diagnosis Date    Acute pain of left knee 04/04/2024    LAMAR (acute kidney injury) (CMS-[facility]) 03/07/2023    Blister (nonthermal), left lesser toe(s), initial encounter 08/15/2022    Blister of toe of left foot without infection, initial encounter    Blister (nonthermal), right lesser toe(s), initial encounter 08/15/2022    Blister of toe of right foot without infection, initial encounter    Carpal tunnel syndrome, left upper limb 07/25/2016    Acute carpal tunnel syndrome, left    Cervicalgia     Neck pain    Chronic kidney disease, stage 3 unspecified (Multi) 02/09/2021    Stage 3 chronic kidney disease    Chronic kidney disease, stage 3 unspecified (Multi) 02/09/2021    Stage 3 chronic kidney disease    COVID-19 03/07/2023    Hyponatremia 03/07/2023    Impacted cerumen, bilateral     Bilateral impacted cerumen    Impacted cerumen, right ear 02/09/2021    Impacted cerumen of right ear    Infiltrate of left lung present on chest x-ray 03/28/2023    Left upper quadrant pain 09/29/2016    Left upper quadrant abdominal pain of unknown etiology    Low back pain, unspecified 11/23/2016    Acute left-sided low back pain without sciatica    Other long term (current) drug therapy 04/06/2021    Medication dose changed     Other shoulder lesions, unspecified shoulder 01/12/2015    Deltoid tendinitis    Other specified abnormal findings of blood chemistry 04/06/2021    Elevated serum creatinine    Pain in left ankle and joints of left foot 04/06/2021    Pain and swelling of left ankle    Pain in right knee 12/03/2020    Pain and swelling of right knee    Personal history of diseases of the skin and subcutaneous tissue 07/28/2020    History of dermatitis    Personal history of other diseases of the circulatory system     History of hypertension    Personal history of other diseases of the nervous system and sense organs 11/09/2020    History of impacted cerumen    Personal history of other endocrine, nutritional and metabolic disease 04/06/2021    History of hypercalcemia    Postmenopausal bleeding 06/05/2023    Pulmonary infiltrate in right lung on chest x-ray 03/16/2023    Puncture wound without foreign body of left forearm, initial encounter 06/17/2020    Puncture wound of left forearm, initial encounter    Radiculopathy, lumbar region 07/06/2021    Left lumbar radiculopathy    Unspecified abnormal findings in urine 07/06/2021    Abnormal urinalysis    Unspecified injury of left ankle, initial encounter 06/25/2022    Injury of left ankle, initial encounter    Urinary tract infection, site not specified 11/15/2020    E. coli UTI       Past Surgical History:   Procedure Laterality Date    BREAST BIOPSY Right     HYSTERECTOMY      OTHER SURGICAL HISTORY  08/15/2022    Rectocele and cystocele repair    OTHER SURGICAL HISTORY  08/15/2022    Leg surgery        reports that she has never smoked. She has never been exposed to tobacco smoke. She has never used smokeless tobacco. She reports that she does not drink alcohol and does not use drugs.    Review of Systems  Review of Systems  CONSTITUTIONAL: No for fever, no chills, no recent weight loss + headache  EYES: No pain, + redness, + discharge No swelling  EARS: No discharge, No pain, No  hearing loss  NOSE: + congestion, No discharge, No bleeding  MOUTH: No throat pain, No hoarseness  NECK: No pain, No stiffness, No swollen glands  CARDIOVASCULAR: No chest pain, No edema, No irregular rhythm, No palpitations  RESPIRATORY: + cough, No dyspnea  GI: No abdominal pain, No constipation or diarrhea, No N/V  : No urgency, No dysuria, No increase or decrease in urine output, No hematuria  MUSCULOSKELETAL: No back pain, No joint pain, No swelling, No weakness  SKIN: No rash, No lesions, No abrasions  NEURO: No dizziness, No alteration in mentation, No headache, No loss of consciousness, No ataxia  PSYCH: No anxiety, No depression, No SI or HI                             Objective    Vitals:    12/29/24 1210   BP: 132/72   Pulse: 80   Resp: 16   Temp: 36.4 °C (97.5 °F)   SpO2: 97%   Weight: 70.3 kg (155 lb)     No LMP recorded. Patient is postmenopausal.    Physical Exam  Gen.: Vitals noted no distress afebrile. Normal phonation, no stridor, no trismus.   ENT: TMs clear bilaterally, EACs unremarkable. Mastoids nontender. Posterior oropharynx without erythema, exudate, or swelling. Uvula is in the midline and nonedematous. No Dale's Angina. + rhinitis and maxillary sinus pressure on palpation + left eye redness and discharge no signs of corneal abrasion or foreign body  Neck: Supple. No meningismus through full range of motion. No lymphadenopathy.   Cardiac: Regular rate rhythm no murmur.   Lungs: Good aeration throughout. No adventitious breath sounds.   Abdomen: Soft nontender nonsurgical throughout. Normoactive bowel sounds.   Extremities: No peripheral edema, negative Homans bilaterally no cords.   Skin: No rash.   Neuro: No focal neurologic deficits. NIH score is 0.    Procedures    Point of Care Test & Imaging Results from this visit    No results found.    Diagnostic study results (if any) were reviewed by SHERMAN Myers-CNP.    Assessment/Plan   Allergies, medications, history, and  pertinent labs/EKGs/Imaging reviewed by CHELSIE Myers.     Medical Decision Making  History and physical is consistent with sinus infection.  Covid negative.  No signs of OM, OE, Strep.  Pt was prescribed abx and will  use flonase along with antihistamine and tylenol or motrin.  F/U with pcp    History and physical exam findings consistent with viral conjunctivitis.  No evidence of keratitis, iritis, uveitis, foreign body, corneal abrasion, or glaucoma.  Will start on topical treatment today.  Encourage continue symptomatic and supportive care measures. Follow-up with ophthalmology/PCP if symptoms persist, return with any new or worsening symptoms. Patient verbalized understanding and agreeable with plan.      Orders and Diagnoses  There are no diagnoses linked to this encounter.      Medical Admin Record      Follow Up Instructions  No follow-ups on file.Pt was prescribed abx and will  use flonase along with antihistamine and tylenol or motrin.  F/U with pcp    Patient disposition:    Electronically signed by CHELSIE Myers  12:20 PM

## 2025-01-07 ENCOUNTER — APPOINTMENT (OUTPATIENT)
Dept: PRIMARY CARE | Facility: CLINIC | Age: 70
End: 2025-01-07
Payer: COMMERCIAL

## 2025-01-19 NOTE — PROGRESS NOTES
Patient ID: Juanis Arboleda is a 69 y.o. female who presents for Follow-up (Pt here for BP follow up. Pt reports that over the last few weeks her BP was running high. Pt took BP this AM and it was 166/80. Refills per request. )    LAST VISIT PLAN FROM: 09/18/2024:  Patient Instructions:  Blood pressure:  Increase metoprolol ER to 100mg once per day-call if problems and call if heart rate starts going below 55 bpm frequently.  Continue to monitor BP, once a week is ok.  Follow up in 3 months, dec or jan. BP checks to be done at neuro and endo the next 2 months.  Reception have her sign a release to get copy of pathology from EGD May 4 2023 SWG.  Fasting blood test in October.  Instructions for obtaining lab tests:   You do not need a paper requisition when obtaining tests at a  facility. No appointment is needed for lab tests.  For fasting blood tests:  Please do not consume calories for 10-12 hours prior to this blood test. It is ok to drink water, you should be hydrated.  Please do not take vitamins, supplements or thyroid medication before your blood is drawn this day.   Most lab results should be available for your review on the  My Chart portal within 48 hours. Please contact the office if you have not received results within 2 weeks of obtaining the test.  Stay off B12 indefinitely. Resume coq10. We will onitor b12 after first of the year.-lab orders for this are not in place at this time.  Cut back on tomatoes and cucumbers due to diarrhea. Call if issues.  Vaccines recommended:  RSV-one time only.  Flu vaccine October.  Covid vaccinated.suggested.  Brace for left knee with lateral and medial support , velcro top and bottom and patella opening.  END LAST VISIT PLAN  -------------------------------------------  HPI  Patient is a 69-year-old female who presents today for follow up on BP.    Since last visit, patient underwent mammogram (see Objective).    We discussed recommended vaccines including  influenza, Covid and RSV vaccines.  Patient refuses.     Patient states she was sick the day after [place] with a cough and nasal congestion.  She did not check for Covid.  Now resolved.  We discussed checking for Covid when she gets ill in the future so we can put it in her chart for future reference if complications or ordering lung imaging in the future.    Hypertension:  BP today is 172/74.  Recheck by me was 162/88.  She states her home reading was 166 systolic this morning.  Follow up on cardiovascular conditions:  Patient continues on metoprolol 100 mg daily, valsartan 320 mg daily, hydrochlorothiazide 12.5 mg daily, and aspirin 81 mg daily.  We discussed adding amlodipine 2.5 mg once per day in the evening.  Advised to message or call in a week if BP is still 150 or higher.  Advised to check BP every 2-3 days for a couple of weeks to monitor while starting this medication.  Advised to call if BP later starts dropping below 110 systolic and we may discontinue this medication.  Cardiac:   Chest pain?No  Palpitations? No  Increased jacobo?  No  Other:  Resp:   Shortness of breath?No  Wheezing No  Cough No  Other:  Extremities:   Leg or ankle swelling?No   Claudication?No  Other:  Neuro:  DizzinessNo  SyncopeNo   Blurred visionNo  New headaches No  Other:  Medications:Taking them regularly.Yes  Side effects.No    Patient had an EKG in 2021 which showed poor R-wave progression.  Follow up echocardiogram in 2021 did not correlate with this.  She had another EKG in 2024 which showed normal sinus rhythm with slow R-wave progression V1-V4 possible old septal infarct similar to prior ECG. No acute abnormalities.     Hyperlipidemia:  No myalgias, nausea, abdominal pain.  Meds: Taking regularly, no adverse effect.  Patient continues on atorvastatin 20 mg daily.  Labs:  Last LDL direct was 85 mg/dL on 11/05/2024.  LDL goal: <100 mg/dL, unless patient has plaque, than goal would be less than 70 mg/dL.  No CT Calcium Score  on record.    Diabetes:  A1c was 8.3% on 11/05/2024.  Type II:  Is taking medications regularly, denies side effects.  Patient continues on metformin ER 1000 mg X2 daily, Novalog 45 units daily, Tresiba 54 units nightly.  Denies hypoglycemia, polyuria, polydipsia.  No new numbness or paresthesias of extremities. No syncope or dizziness. No blurred vision.  Lab Results   Component Value Date    HGBA1C 8.3 (H) 11/05/2024    HGBA1C 8.0 (H) 10/05/2023    HGBA1C 8.0 05/06/2020     Lab Results   Component Value Date    LDLCALC 69 11/05/2024    CREATININE 1.04 11/05/2024      Lab Results   Component Value Date    GLUCOSE 205 (H) 11/05/2024    CALCIUM 9.9 11/05/2024     11/05/2024    K 4.9 11/05/2024    CO2 28 11/05/2024     11/05/2024    BUN 23 11/05/2024    CREATININE 1.04 11/05/2024        Patient wears a Dexcom G7 CGM.    Patient follows with Endocrinology; last seen 11/08/2024.  Her BP was 127/82 at that visit.  I reviewed her blood work that she completed for Endocrinology.  Her B12 level was WNL.  Her homocysteine level was high at 20 which was 18 in May.  MMA stable.  Patient reports not taking her B12 for years.  She followed with Neurology in the past and has an upcoming appointment in 03/2025.  Advised to speak with neurologist about this at her next visit.  Recommended having her B12 level checked annually due to diabetes and stomach issues with goal not below 400.  We discussed that taking metformin and Protonix make it difficult for her body to absorb B12 from her food.    Recommended discussing with Endocrinology about considering Farxiga or similar medication in the future.  Patient was concerned about yeast infections as she is prone to getting them.  We discussed that the medication can be stopped if that happens.    Kidney function is stable.    Fall:  Patient states that she had a fall in August 2024 and will keep tripping over her toe, reporting left leg numbness.  Of note, patient has  "chronic let foot drop.  Order placed for disability placard.    GERD:  Stable.  Patient continues on Protonix 20 mg daily.      Orders placed for prescriptions as required.    Order placed for disability placard.    Follow up 05/28/2025.    Review of Systems  See ROS in HPI    Current Outpatient Medications   Medication Instructions    amLODIPine (NORVASC) 2.5 mg, oral, Daily    aspirin 81 mg    atorvastatin (LIPITOR) 20 mg, oral, Every Day    BD Ultra-Fine Karis Pen Needle 32 gauge x 5/32\" needle use FOUR TIMES DAILY AS DIRECTED    coenzyme Q-10 200 mg, Every Day    fluocinolone (DermOtic) 0.01 % ear drops 1-2 DROPS EVERY DAY AS NEEDED    hydroCHLOROthiazide (Microzide) 12.5 mg tablet Date: 04/08/2021 14:39:00 EDT    insulin aspart (NOVOLOG) 45 Units, Every Day    insulin degludec (TRESIBA FLEXTOUCH) 54 Units, Nightly    lidocaine (Lidoderm) 5 % patch Apply 1 patch over 12 hours topically Daily Remove & discard patch within 12 hours or as directed by MD.    metoprolol succinate XL (TOPROL-XL) 100 mg, oral, Every Day    OneTouch Ultra Test strip USE 1 STRIP TO CHECK GLUCOSE THREE TIMES DAILY    pantoprazole (PROTONIX) 20 mg, oral, Every Day    valsartan (DIOVAN) 320 mg, oral, Every Day, On hold since hospital discharge on the 9th of march.    zinc acetate 25 mg     Allergies   Allergen Reactions    Exenatide Nausea Only and Other     Byetta    Insulin Detemir Other    Ramipril Cough    Penicillins Rash     Objective    The following test results have been reviewed:  Mammogram 11/20/2024:  IMPRESSION:  No mammographic evidence of malignancy.  BI-RADS CATEGORY:  BI-RADS Category:  1 Negative.  Recommendation:  Annual Screening.  Recommended Date:  1 Year.  Laterality:  Bilateral.  For any future breast imaging appointments, please call [phone]).  MACRO:  None  Signed by: Amarilis Kwok 11/24/2024 12:25 PM  Dictation workstation:   [id number]     Latest Complete Lab Results:  CBC:   Lab Results " "  Component Value Date    WBC 8.0 11/05/2024    RBC 4.52 11/05/2024    HGB 12.4 11/05/2024    HCT 39.5 11/05/2024    MCV 87 11/05/2024    MCH 27.4 11/05/2024    MCHC 31.4 (L) 11/05/2024     11/05/2024    MPV 10.8 10/05/2023     CMP:  Lab Results   Component Value Date    GLUCOSE 205 (H) 11/05/2024     11/05/2024    K 4.9 11/05/2024     11/05/2024    CO2 28 11/05/2024    ANIONGAP 13 11/05/2024    BUN 23 11/05/2024    CREATININE 1.04 11/05/2024    GFRF 55 (A) 04/21/2023    CALCIUM 9.9 11/05/2024    ALBUMIN 4.3 11/05/2024    ALKPHOS 63 11/05/2024    PROT 6.9 11/05/2024    AST 18 11/05/2024    BILITOT 1.0 11/05/2024    ALT 23 11/05/2024      Lipid:   Lab Results   Component Value Date    CHOL 142 11/05/2024    HDL 35.8 11/05/2024    CHHDL 4.0 11/05/2024    LDLF 93 09/20/2022    VLDL 37 11/05/2024    TRIG 184 (H) 11/05/2024     LDL Direct:  Lab Results   Component Value Date    LDLDIRECT 85 11/05/2024      TSH:   Lab Results   Component Value Date    TSH 2.53 11/05/2024      B12:  Lab Results   Component Value Date    CGAWNIXB95 677 01/21/2025     Vitamin D:  Lab Results   Component Value Date    VITD25 37 01/25/2023      HgA1c:   Lab Results   Component Value Date    HGBA1C 8.3 (H) 11/05/2024    CJYJYJXV0B 192 11/05/2024       Physical Exam  Vitals:      5/22/2024     2:25 PM 9/18/2024    11:28 AM 9/18/2024     1:12 PM 11/20/2024     9:50 AM 12/29/2024    12:10 PM 1/28/2025    11:22 AM 1/28/2025    12:41 PM   Vitals   Systolic 128 158 125  132 172 162   Diastolic 66 72 69  72 74 88   BP Location Left arm      Left arm   Heart Rate  72 64  80 60    Temp     36.4 °C (97.5 °F)     Resp  16   16 18    Height  1.575 m (5' 2\")  1.6 m (5' 3\")  1.575 m (5' 2\")    Weight (lb)  152  155 155 161    BMI  27.8 kg/m2  27.46 kg/m2 27.46 kg/m2 29.45 kg/m2    BSA (m2)  1.74 m2  1.77 m2 1.77 m2 1.79 m2      Patient looks well and is in no obvious distress.  HEENT:   Normocephalic, no facial asymmetry  Eyes: Sclera and " conjunctiva are clear.  Neck: No adenopathy cervical (Ant/post/lat), no Supraclavicular nodes.   Thyroid normal.  Carotid pulses normal, no carotid bruits.  Lungs : RR normal. Clear to auscultation anterior, posterior and lateral. No rales, wheezes rhonchi or rubs. Good air exchange.  Heart: RRR. No gallop, click or rub.  Patient has a 1/6 systolic murmur heard URSB which is not new.  Murmur is 2/6 systolic in the supine position heard URSB.  Abdomen: Bowel sounds normal, No bruits. No pulsatile mass. No hepatosplenomegaly, masses or tenderness. Soft, no guarding.   Extremities: No upper extremity edema. No lower extremity edema.   Musculoskeletal: No synovitis of joints seen. No new deformity.  Neuro: CN 2-12 intact. Alert, appropriate.  Ambulates with a quad cane due to chronic foot drop.  No gross motor deficit.   No tremors.  Psych: normal mood and affect.  Skin: No rash, bruising petechiae or jaundice.    Juanis was seen today for follow-up.  Diagnoses and all orders for this visit:  Primary hypertension (Primary)  -     amLODIPine (Norvasc) 2.5 mg tablet; Take 1 tablet (2.5 mg) by mouth once daily.  Accelerated hypertension  -     metoprolol succinate XL (Toprol-XL) 100 mg 24 hr tablet; Take 1 tablet (100 mg) by mouth once every day.  -     amLODIPine (Norvasc) 2.5 mg tablet; Take 1 tablet (2.5 mg) by mouth once daily.  Hyperlipidemia associated with type 2 diabetes mellitus (Multi)  Type 2 diabetes mellitus with hyperglycemia, with long-term current use of insulin  Gastroesophageal reflux disease, unspecified whether esophagitis present  -     pantoprazole (ProtoNix) 20 mg EC tablet; Take 1 tablet (20 mg) by mouth once every day.  Left foot drop  -     Disability Placard  Primary osteoarthritis of left hip  -     Disability Placard  Other chronic pain  -     Disability Placard  Homocysteinemia  Diabetes mellitus due to underlying condition with stage 3a chronic kidney disease, with long-term current use of  insulin (Multi)  Immunization counseling     Hypertension not well controlled. Add low dose amlodpipine, reviewed potential benefits and s/e.     See patient instructions in wrap up plan, orders and comments for treatment plan.  Patient Instructions:  Discuss homocysteine and methylmalonic acid levels with the neurologist in March. B12 level is ok but would be ok to take b12 if he suggests it.  Your B12 level should be checked annually because of the diabetes stomach issue, and taking metformin and Protonix make it more difficult to absorb b12 out of your diet. Goal is tl keep B12 level over 400 .    Ask Endo about considering farxiga or similar, if you would get yeast infections can always stop it.    Vaccination against influenza, RSV (one time only) and covid are recommended. We are aware you prefer not obtaining these vaccines.    Follow up in 3 months.    For increase in blood pressure:  Add amlodipine 2.5 mg once per day in the evening.  Message or call in a week if bp is still 150 or higher.    Check bp every 2-3 days for couple weeks on this med.  Call if bp later starts dropping below 110 on the top number and we might stop this med.             I am the Internal Medicine physician providing ongoing chronic medical care for this patient, which is managed during and in between office visits.    Scribe Attestation  By signing my name below, IRuth Scribe   attest that this documentation has been prepared under the direction and in the presence of Megan Domingo MD.

## 2025-01-21 ENCOUNTER — LAB (OUTPATIENT)
Dept: LAB | Facility: LAB | Age: 70
End: 2025-01-21
Payer: COMMERCIAL

## 2025-01-21 DIAGNOSIS — R79.89 HIGH SERUM VITAMIN B12: ICD-10-CM

## 2025-01-21 DIAGNOSIS — E78.5 HYPERLIPIDEMIA, UNSPECIFIED HYPERLIPIDEMIA TYPE: ICD-10-CM

## 2025-01-21 DIAGNOSIS — E53.8 B12 DEFICIENCY: ICD-10-CM

## 2025-01-21 PROCEDURE — 36415 COLL VENOUS BLD VENIPUNCTURE: CPT

## 2025-01-21 PROCEDURE — 82607 VITAMIN B-12: CPT

## 2025-01-21 PROCEDURE — 83921 ORGANIC ACID SINGLE QUANT: CPT

## 2025-01-21 PROCEDURE — 83090 ASSAY OF HOMOCYSTEINE: CPT

## 2025-01-22 LAB
HCYS SERPL-SCNC: 20.9 UMOL/L (ref 5–13.9)
VIT B12 SERPL-MCNC: 677 PG/ML (ref 211–911)

## 2025-01-24 LAB — METHYLMALONATE SERPL-SCNC: 0.55 UMOL/L (ref 0–0.4)

## 2025-01-28 ENCOUNTER — APPOINTMENT (OUTPATIENT)
Dept: PRIMARY CARE | Facility: CLINIC | Age: 70
End: 2025-01-28
Payer: COMMERCIAL

## 2025-01-28 VITALS
WEIGHT: 161 LBS | SYSTOLIC BLOOD PRESSURE: 162 MMHG | DIASTOLIC BLOOD PRESSURE: 88 MMHG | BODY MASS INDEX: 29.63 KG/M2 | HEIGHT: 62 IN | HEART RATE: 60 BPM | RESPIRATION RATE: 18 BRPM

## 2025-01-28 DIAGNOSIS — I10 PRIMARY HYPERTENSION: Primary | ICD-10-CM

## 2025-01-28 DIAGNOSIS — G89.29 OTHER CHRONIC PAIN: ICD-10-CM

## 2025-01-28 DIAGNOSIS — E11.65 TYPE 2 DIABETES MELLITUS WITH HYPERGLYCEMIA, WITH LONG-TERM CURRENT USE OF INSULIN: ICD-10-CM

## 2025-01-28 DIAGNOSIS — N18.31 DIABETES MELLITUS DUE TO UNDERLYING CONDITION WITH STAGE 3A CHRONIC KIDNEY DISEASE, WITH LONG-TERM CURRENT USE OF INSULIN (MULTI): ICD-10-CM

## 2025-01-28 DIAGNOSIS — E08.22 DIABETES MELLITUS DUE TO UNDERLYING CONDITION WITH STAGE 3A CHRONIC KIDNEY DISEASE, WITH LONG-TERM CURRENT USE OF INSULIN (MULTI): ICD-10-CM

## 2025-01-28 DIAGNOSIS — M16.12 PRIMARY OSTEOARTHRITIS OF LEFT HIP: ICD-10-CM

## 2025-01-28 DIAGNOSIS — Z79.4 TYPE 2 DIABETES MELLITUS WITH HYPERGLYCEMIA, WITH LONG-TERM CURRENT USE OF INSULIN: ICD-10-CM

## 2025-01-28 DIAGNOSIS — K21.9 GASTROESOPHAGEAL REFLUX DISEASE, UNSPECIFIED WHETHER ESOPHAGITIS PRESENT: ICD-10-CM

## 2025-01-28 DIAGNOSIS — I10 ACCELERATED HYPERTENSION: ICD-10-CM

## 2025-01-28 DIAGNOSIS — R79.83 HOMOCYSTEINEMIA: ICD-10-CM

## 2025-01-28 DIAGNOSIS — E11.69 HYPERLIPIDEMIA ASSOCIATED WITH TYPE 2 DIABETES MELLITUS (MULTI): ICD-10-CM

## 2025-01-28 DIAGNOSIS — E78.5 HYPERLIPIDEMIA ASSOCIATED WITH TYPE 2 DIABETES MELLITUS (MULTI): ICD-10-CM

## 2025-01-28 DIAGNOSIS — M21.372 LEFT FOOT DROP: Chronic | ICD-10-CM

## 2025-01-28 DIAGNOSIS — Z71.85 IMMUNIZATION COUNSELING: ICD-10-CM

## 2025-01-28 DIAGNOSIS — Z79.4 DIABETES MELLITUS DUE TO UNDERLYING CONDITION WITH STAGE 3A CHRONIC KIDNEY DISEASE, WITH LONG-TERM CURRENT USE OF INSULIN (MULTI): ICD-10-CM

## 2025-01-28 PROBLEM — K22.9 IRREGULAR Z LINE OF ESOPHAGUS: Status: ACTIVE | Noted: 2023-05-04

## 2025-01-28 PROBLEM — K57.30 COLON, DIVERTICULOSIS: Status: ACTIVE | Noted: 2023-05-04

## 2025-01-28 PROBLEM — K29.70 GASTRITIS: Status: RESOLVED | Noted: 2023-05-04 | Resolved: 2025-01-28

## 2025-01-28 PROCEDURE — 1126F AMNT PAIN NOTED NONE PRSNT: CPT | Performed by: INTERNAL MEDICINE

## 2025-01-28 PROCEDURE — G2211 COMPLEX E/M VISIT ADD ON: HCPCS | Performed by: INTERNAL MEDICINE

## 2025-01-28 PROCEDURE — 1159F MED LIST DOCD IN RCRD: CPT | Performed by: INTERNAL MEDICINE

## 2025-01-28 PROCEDURE — 3077F SYST BP >= 140 MM HG: CPT | Performed by: INTERNAL MEDICINE

## 2025-01-28 PROCEDURE — 1160F RVW MEDS BY RX/DR IN RCRD: CPT | Performed by: INTERNAL MEDICINE

## 2025-01-28 PROCEDURE — 3008F BODY MASS INDEX DOCD: CPT | Performed by: INTERNAL MEDICINE

## 2025-01-28 PROCEDURE — 1123F ACP DISCUSS/DSCN MKR DOCD: CPT | Performed by: INTERNAL MEDICINE

## 2025-01-28 PROCEDURE — 1158F ADVNC CARE PLAN TLK DOCD: CPT | Performed by: INTERNAL MEDICINE

## 2025-01-28 PROCEDURE — 3078F DIAST BP <80 MM HG: CPT | Performed by: INTERNAL MEDICINE

## 2025-01-28 PROCEDURE — 99214 OFFICE O/P EST MOD 30 MIN: CPT | Performed by: INTERNAL MEDICINE

## 2025-01-28 PROCEDURE — 4010F ACE/ARB THERAPY RXD/TAKEN: CPT | Performed by: INTERNAL MEDICINE

## 2025-01-28 RX ORDER — AMLODIPINE BESYLATE 2.5 MG/1
2.5 TABLET ORAL DAILY
Qty: 90 TABLET | Refills: 1 | Status: SHIPPED | OUTPATIENT
Start: 2025-01-28

## 2025-01-28 RX ORDER — PANTOPRAZOLE SODIUM 20 MG/1
20 TABLET, DELAYED RELEASE ORAL
Qty: 90 TABLET | Refills: 3 | Status: SHIPPED | OUTPATIENT
Start: 2025-01-28

## 2025-01-28 RX ORDER — METOPROLOL SUCCINATE 100 MG/1
100 TABLET, EXTENDED RELEASE ORAL
Qty: 90 TABLET | Refills: 3 | Status: SHIPPED | OUTPATIENT
Start: 2025-01-28

## 2025-01-28 ASSESSMENT — PATIENT HEALTH QUESTIONNAIRE - PHQ9
1. LITTLE INTEREST OR PLEASURE IN DOING THINGS: NOT AT ALL
2. FEELING DOWN, DEPRESSED OR HOPELESS: NOT AT ALL
SUM OF ALL RESPONSES TO PHQ9 QUESTIONS 1 AND 2: 0

## 2025-01-28 ASSESSMENT — PAIN SCALES - GENERAL: PAINLEVEL_OUTOF10: 0-NO PAIN

## 2025-01-28 NOTE — PATIENT INSTRUCTIONS
Discuss homocysteine and methylmalonic acid levels with the neurologist in March. B12 level is ok but would be ok to take b12 if he suggests it.  Your B12 level should be checked annually because of the diabetes stomach issue, and taking metformin and Protonix make it more difficult to absorb b12 out of your diet. Goal is tl keep B12 level over 400 .    Ask Endo about considering farxiga or similar, if you would get yeast infections can always stop it.    Vaccination against influenza, RSV (one time only) and covid are recommended. We are aware you prefer not obtaining these vaccines.    Follow up in 3 months.    For increase in blood pressure:  Add amlodipine 2.5 mg once per day in the evening.  Message or call in a week if bp is still 150 or higher.    Check bp every 2-3 days for couple weeks on this med.  Call if bp later starts dropping below 110 on the top number and we might stop this med.

## 2025-04-14 DIAGNOSIS — I10 ACCELERATED HYPERTENSION: ICD-10-CM

## 2025-04-14 RX ORDER — METOPROLOL SUCCINATE 100 MG/1
100 TABLET, EXTENDED RELEASE ORAL
Qty: 90 TABLET | Refills: 3 | Status: SHIPPED | OUTPATIENT
Start: 2025-04-14

## 2025-04-18 NOTE — PATIENT COMMUNICATION
"Please refill.   Patient stated \"I have been taking 1 in morning, 1 at bedtime and sometimes 1 during day, if not I take either Advil or Tylenol. \"    gabapentin (Neurontin) 100 mg capsule   Take 2 capsules (200 mg) by mouth once daily at bedtime -Changed to what patient previously reported  Drug [place] [place], [place]  " none

## 2025-04-22 DIAGNOSIS — I15.9 SECONDARY HYPERTENSION: ICD-10-CM

## 2025-04-22 DIAGNOSIS — K21.9 GASTROESOPHAGEAL REFLUX DISEASE, UNSPECIFIED WHETHER ESOPHAGITIS PRESENT: ICD-10-CM

## 2025-04-22 RX ORDER — PANTOPRAZOLE SODIUM 20 MG/1
20 TABLET, DELAYED RELEASE ORAL
Qty: 90 TABLET | Refills: 1 | Status: SHIPPED | OUTPATIENT
Start: 2025-04-22

## 2025-04-22 RX ORDER — HYDROCHLOROTHIAZIDE 12.5 MG/1
TABLET ORAL
Qty: 90 TABLET | Refills: 1 | Status: SHIPPED | OUTPATIENT
Start: 2025-04-22

## 2025-05-25 NOTE — PROGRESS NOTES
Patient ID: Juanis Arboleda is a 70 y.o. female who presents for No chief complaint on file.    (LAST VISIT PLAN FROM: 09/18/2024:  Patient Instructions:  Blood pressure:  Increase metoprolol ER to 100mg once per day-call if problems and call if heart rate starts going below 55 bpm frequently.  Continue to monitor BP, once a week is ok.  Follow up in 3 months, dec or jan. BP checks to be done at neuro and endo the next 2 months.  Reception have her sign a release to get copy of pathology from EGD May 4 2023 [facility].  Fasting blood test in October.  Instructions for obtaining lab tests:   You do not need a paper requisition when obtaining tests at a  facility. No appointment is needed for lab tests.  For fasting blood tests:  Please do not consume calories for 10-12 hours prior to this blood test. It is ok to drink water, you should be hydrated.  Please do not take vitamins, supplements or thyroid medication before your blood is drawn this day.   Most lab results should be available for your review on the  My Chart portal within 48 hours. Please contact the office if you have not received results within 2 weeks of obtaining the test.  Stay off B12 indefinitely. Resume coq10. We will onitor b12 after first of the year.-lab orders for this are not in place at this time.  Cut back on tomatoes and cucumbers due to diarrhea. Call if issues.  Vaccines recommended:  RSV-one time only.  Flu vaccine October.  Covid vaccinated.suggested.  Brace for left knee with lateral and medial support , velcro top and bottom and patella opening  END LAST VISIT PLAN)  -------------------------------------------  HPI  Patient is a 70-year-old female who presents today for follow up on the following conditions:    Hypertension:  BP today is ***.  Follow up on cardiovascular conditions:  Patient continues on amlodipine 2.5 mg daily, aspirin 81 mg daily, metoprolol succinate 100 mg daily, hydrochlorothiazide 12.5 mg daily and valsartan 320  "mg daily.  Cardiac:   Chest pain?{YES/NO:24023::\"No\"}  Palpitations? {YES/NO:24023::\"No\"}  Increased jacobo?  {YES/NO:24023::\"No\"}  Other:  Resp:   Shortness of breath?{YES/NO:23201::\"No\"}  Wheezing {YES/NO:24023::\"No\"}  Cough {YES/NO:24023::\"No\"}  Other:  Extremities:   Leg or ankle swelling?{YES/NO:24023::\"No\"}   Claudication?{YES/NO:24023::\"No\"}  Other:  Neuro:  Dizziness{YES/NO:24023::\"No\"}  Syncope{YES/NO:24023::\"No\"}   Blurred vision{YES/NO:24023::\"No\"}  New headaches {YES/NO:24023::\"No\"}  Other:  Medications:Taking them regularly.{YES/NO:24023::\"Yes\"}  Side effects.{YES/NO:24023::\"No\"}    Hyperlipidemia:  No myalgias, nausea, abdominal pain.    Meds: Taking regularly, no adverse effect.  Patient continues on Lipitor 20 mg daily.  Labs: Last LDL was 85 mg/dL on 11/05/2024.  LDL goal: <70 mg/dL.  ASCVD risk***.    Diabetes:  Type II:  Is taking medications regularly, denies side effects.  Patient continues on Novalog 45 units daily and Tresiba Flextouch 54 units nightly.  Denies hypoglycemia, polyuria, polydipsia.  No new numbness or paresthesias of extremities. No syncope or dizziness. No blurred vision.  Lab Results   Component Value Date    HGBA1C 8.3 (H) 11/05/2024    HGBA1C 8.0 (H) 10/05/2023    HGBA1C 8.0 05/06/2020     Lab Results   Component Value Date    LDLCALC 69 11/05/2024    CREATININE 1.04 11/05/2024      Lab Results   Component Value Date    GLUCOSE 205 (H) 11/05/2024    CALCIUM 9.9 11/05/2024     11/05/2024    K 4.9 11/05/2024    CO2 28 11/05/2024     11/05/2024    BUN 23 11/05/2024    CREATININE 1.04 11/05/2024      No results found for this or any previous visit (from the past 4464 hours).     GERD:  Patient continues on Protonix 20 mg daily.    Follow up***    Review of Systems  See ROS in HPI    Current Outpatient Medications   Medication Instructions    amLODIPine (NORVASC) 2.5 mg, oral, Daily    aspirin 81 mg    atorvastatin (LIPITOR) 20 mg, oral, Every Day    BD Ultra-Fine Karis " "Pen Needle 32 gauge x 5/32\" needle use FOUR TIMES DAILY AS DIRECTED    coenzyme Q-10 200 mg, Every Day    fluocinolone (DermOtic) 0.01 % ear drops 1-2 DROPS EVERY DAY AS NEEDED    hydroCHLOROthiazide (Microzide) 12.5 mg tablet Date: 04/08/2021 14:39:00 EDT    insulin aspart (NOVOLOG) 45 Units, Every Day    insulin degludec (TRESIBA FLEXTOUCH) 54 Units, Nightly    lidocaine (Lidoderm) 5 % patch Apply 1 patch over 12 hours topically Daily Remove & discard patch within 12 hours or as directed by MD.    metoprolol succinate XL (TOPROL-XL) 100 mg, oral, Every Day    OneTouch Ultra Test strip USE 1 STRIP TO CHECK GLUCOSE THREE TIMES DAILY    pantoprazole (PROTONIX) 20 mg, oral, Every Day    valsartan (DIOVAN) 320 mg, oral, Every Day, On hold since hospital discharge on the 9th of march.    zinc acetate 25 mg     RX Allergies[1]  Objective    The following test results have been reviewed:  Latest Complete Lab Results:  CBC:   Lab Results   Component Value Date    WBC 8.0 11/05/2024    RBC 4.52 11/05/2024    HGB 12.4 11/05/2024    HCT 39.5 11/05/2024    MCV 87 11/05/2024    MCH 27.4 11/05/2024    MCHC 31.4 (L) 11/05/2024     11/05/2024    MPV 10.8 10/05/2023     CMP:  Lab Results   Component Value Date    GLUCOSE 205 (H) 11/05/2024     11/05/2024    K 4.9 11/05/2024     11/05/2024    CO2 28 11/05/2024    ANIONGAP 13 11/05/2024    BUN 23 11/05/2024    CREATININE 1.04 11/05/2024    GFRF 55 (A) 04/21/2023    CALCIUM 9.9 11/05/2024    ALBUMIN 4.3 11/05/2024    ALKPHOS 63 11/05/2024    PROT 6.9 11/05/2024    AST 18 11/05/2024    BILITOT 1.0 11/05/2024    ALT 23 11/05/2024      Lipid:   Lab Results   Component Value Date    CHOL 142 11/05/2024    HDL 35.8 11/05/2024    CHHDL 4.0 11/05/2024    LDLF 93 09/20/2022    VLDL 37 11/05/2024    TRIG 184 (H) 11/05/2024     LDL Direct:  Lab Results   Component Value Date    LDLDIRECT 85 11/05/2024      TSH:   Lab Results   Component Value Date    TSH 2.53 11/05/2024    " "  B12:  Lab Results   Component Value Date    XWNVPORX77 677 01/21/2025     Vitamin D:  Lab Results   Component Value Date    VITD25 37 01/25/2023      HgA1c:   Lab Results   Component Value Date    HGBA1C 8.3 (H) 11/05/2024    ECJVDUPX2G 192 11/05/2024       Physical Exam  Vitals:      5/22/2024     2:25 PM 9/18/2024    11:28 AM 9/18/2024     1:12 PM 11/20/2024     9:50 AM 12/29/2024    12:10 PM 1/28/2025    11:22 AM 1/28/2025    12:41 PM   Vitals   Systolic 128 158 125  132 172 162   Diastolic 66 72 69  72 74 88   BP Location Left arm      Left arm   Heart Rate  72 64  80 60    Temp     36.4 °C (97.5 °F)     Resp  16   16 18    Height  1.575 m (5' 2\")  1.6 m (5' 3\")  1.575 m (5' 2\")    Weight (lb)  152  155 155 161    BMI  27.8 kg/m2  27.46 kg/m2 27.46 kg/m2 29.45 kg/m2    BSA (m2)  1.74 m2  1.77 m2 1.77 m2 1.79 m2      Patient looks their usual self, is known to me, and is in no obvious distress.  HEENT:   Normocephalic, no facial asymmetry  Eyes: Sclera and conjunctiva are clear.  Neck: No adenopathy cervical (Ant/post/lat), no Supraclavicular nodes.   Thyroid normal.  Carotid pulses normal, no carotid bruits.  Lungs : RR normal. Clear to auscultation anterior, posterior and lateral. No rales, wheezes rhonchi or rubs. Good air exchange.  Heart: RRR. No Murmur, gallop, click or rub.  Abdomen: Bowel sounds normal, No bruits. No pulsatile mass. No hepatosplenomegaly, masses or tenderness. Soft, no guarding.  Vascular:  Posterior tibialis and dorsalis pedis pulses within normal limits bilaterally.   Extremities: No upper extremity edema. No lower extremity edema.   Musculoskeletal: No synovitis of joints seen. No new deformity.  Neuro: CN 2-12 intact. Alert, appropriate.  Ambulates independently.  No gross motor deficit.   No tremors.  Psych: normal mood and affect.  Skin: No rash, bruising petechiae or jaundice.      Diagnoses and all orders for this visit:  Hyperlipidemia associated with type 2 diabetes mellitus " (Primary)  Primary hypertension  Type 2 diabetes mellitus with diabetic neuropathy, with long-term current use of insulin  Gastroesophageal reflux disease, unspecified whether esophagitis present     Problem List Items Addressed This Visit       Hyperlipidemia associated with type 2 diabetes mellitus - Primary    HTN (hypertension)    Type 2 diabetes mellitus with diabetic neuropathy (Multi)    Gastroesophageal reflux disease     See patient instructions in wrap up plan, orders and comments for treatment plan.  Patient Instructions:         I am the Internal Medicine physician providing ongoing chronic medical care for this patient, which is managed during and in between office visits.    Scribe Attestation  By signing my name below, IRuth Scribe   attest that this documentation has been prepared under the direction and in the presence of Megan Domingo MD.        [1]   Allergies  Allergen Reactions    Exenatide Nausea Only and Other     Byetta    Insulin Detemir Other    Ramipril Cough    Penicillins Rash      the supine position heard URSB.   Abdomen: Bowel sounds normal, No bruits. No pulsatile mass. No hepatosplenomegaly, masses or tenderness. Soft, no guarding.  Vascular:  Posterior tibialis and dorsalis pedis pulses within normal limits bilaterally.   Extremities: No upper extremity edema. No lower extremity edema.   Musculoskeletal: No synovitis of joints seen. No new deformity. Atroply left lower leg is not as pronounced, better muscle tone, still smaller than right lower leg but improving. Small amount anterior Fluid in bursa of knee. Not inflamed.  Neuro: CN 2-12 intact. Alert, appropriate.  Ambulates independently.  No gross motor deficit.   No tremors.  Psych: normal mood and affect.  Skin: No rash, bruising petechiae or jaundice.      Juanis was seen today for hypertension.  Diagnoses and all orders for this visit:  Hyperlipidemia associated with type 2 diabetes mellitus (Primary)  -     Lipid Panel; Future  -     Comprehensive Metabolic Panel; Future  -     TSH with reflex to Free T4 if abnormal; Future  -     Cholesterol, LDL Direct; Future  Primary hypertension  -     amLODIPine (Norvasc) 2.5 mg tablet; Take 1 tablet (2.5 mg) by mouth once daily.  -     Albumin-Creatinine Ratio, Urine Random; Future  Type 2 diabetes mellitus with diabetic neuropathy, with long-term current use of insulin  -     CBC and Auto Differential; Future  -     Comprehensive Metabolic Panel; Future  Gastroesophageal reflux disease, unspecified whether esophagitis present  Stage 3a chronic kidney disease (Multi)  Comments:  pt prefers not trying sglt2i-fear of yeast infections, labs ordered for monitoring, keep bp and dm wellcontrolled, a1c goal less than 7.5.avoid nsaids.  Orders:  -     CBC and Auto Differential; Future  Long term current use of diuretic  -     Basic metabolic panel; Future  -     Magnesium; Future  -     Basic metabolic panel  -     Magnesium  Hypertension associated with stage 3 chronic kidney disease due to  type 2 diabetes mellitus  -     Basic metabolic panel; Future  -     Magnesium; Future  -     Basic metabolic panel  -     Magnesium  Hypertension, unspecified type  -     valsartan (Diovan) 320 mg tablet; Take 1 tablet (320 mg) by mouth once every day. On hold since hospital discharge on the 9th of march.  Hyperhomocysteinemia (Multi)  -     Vitamin B12; Future  Vitamin D deficiency  -     Vitamin D 25-Hydroxy,Total (for eval of Vitamin D levels); Future  Systolic murmur  Comments:  1/6 stable   D/w her that there is not evidence that lowering the homocysteine level is of benefit to the patient. Should keep b12 normal which it is.  Will continue to monitor b12.    If lightheadeness more of an issue consider decr or stop hydrochlorothiazide or decrease dose metoprolol   If bradycardia becomes an issue could decr metoprolol and inc amlodipine if needed for bp.      See patient instructions in wrap up plan, orders and comments for treatment plan.  Patient Instructions:  If the Prevnar vaccine you had, was a Prevnar 13, it is recommended to get a Prevnar 20 at this point.  If it was a prevnar 15, you do not need anything additional at this time.    Call if the occasional lightheadedness on standing becomes more frequent than the once per week.    Non fasting blood test today or soon to check kidney function and magnesium.    Follow up annual wellness sept /October.    Fasting blood and urine test in the week or 2 before that appt, at least 48 hours before.  Instructions for obtaining lab tests:   For fasting blood tests:  Please do not consume calories for 10-12 hours prior to this blood test. It is ok to drink water, you should be hydrated.  Please do not take vitamins, supplements or thyroid medication before your blood is drawn this day.   Most lab results should be available for your review on the  My Chart portal within 48 hours. Please contact the office if you have not seen or been given results of these tests  within 2 weeks of having them done.    Follow up for annual wellness and physical in fall, sept or October.    Same medications.    I am the Internal Medicine physician providing ongoing chronic medical care for this patient, which is managed during and in between office visits.    Scribe Attestation   By signing my name below, IDori Scribe attestation that this documentation has been prepared under the direction and in the presence of Megan Domingo MD.      Scribe Attestation  By signing my name below, IRuth Scribe   attest that this documentation has been prepared under the direction and in the presence of Megan Domingo MD.          [1]   Allergies  Allergen Reactions    Exenatide Nausea Only and Other     Byetta    Insulin Detemir Other    Ramipril Cough    Penicillins Rash

## 2025-05-28 ENCOUNTER — APPOINTMENT (OUTPATIENT)
Dept: PRIMARY CARE | Facility: CLINIC | Age: 70
End: 2025-05-28
Payer: COMMERCIAL

## 2025-05-30 ENCOUNTER — APPOINTMENT (OUTPATIENT)
Dept: PRIMARY CARE | Facility: CLINIC | Age: 70
End: 2025-05-30
Payer: COMMERCIAL

## 2025-05-30 VITALS
SYSTOLIC BLOOD PRESSURE: 126 MMHG | BODY MASS INDEX: 29.7 KG/M2 | DIASTOLIC BLOOD PRESSURE: 66 MMHG | RESPIRATION RATE: 16 BRPM | HEIGHT: 62 IN | HEART RATE: 59 BPM | WEIGHT: 161.4 LBS | OXYGEN SATURATION: 96 %

## 2025-05-30 DIAGNOSIS — E72.11 HYPERHOMOCYSTEINEMIA (MULTI): ICD-10-CM

## 2025-05-30 DIAGNOSIS — Z79.4 TYPE 2 DIABETES MELLITUS WITH DIABETIC NEUROPATHY, WITH LONG-TERM CURRENT USE OF INSULIN: ICD-10-CM

## 2025-05-30 DIAGNOSIS — Z79.899 LONG TERM CURRENT USE OF DIURETIC: ICD-10-CM

## 2025-05-30 DIAGNOSIS — I12.9 HYPERTENSION ASSOCIATED WITH STAGE 3 CHRONIC KIDNEY DISEASE DUE TO TYPE 2 DIABETES MELLITUS: ICD-10-CM

## 2025-05-30 DIAGNOSIS — E11.40 TYPE 2 DIABETES MELLITUS WITH DIABETIC NEUROPATHY, WITH LONG-TERM CURRENT USE OF INSULIN: ICD-10-CM

## 2025-05-30 DIAGNOSIS — K21.9 GASTROESOPHAGEAL REFLUX DISEASE, UNSPECIFIED WHETHER ESOPHAGITIS PRESENT: ICD-10-CM

## 2025-05-30 DIAGNOSIS — E78.5 HYPERLIPIDEMIA ASSOCIATED WITH TYPE 2 DIABETES MELLITUS: Primary | ICD-10-CM

## 2025-05-30 DIAGNOSIS — E55.9 VITAMIN D DEFICIENCY: ICD-10-CM

## 2025-05-30 DIAGNOSIS — E11.22 HYPERTENSION ASSOCIATED WITH STAGE 3 CHRONIC KIDNEY DISEASE DUE TO TYPE 2 DIABETES MELLITUS: ICD-10-CM

## 2025-05-30 DIAGNOSIS — E11.69 HYPERLIPIDEMIA ASSOCIATED WITH TYPE 2 DIABETES MELLITUS: Primary | ICD-10-CM

## 2025-05-30 DIAGNOSIS — R01.1 SYSTOLIC MURMUR: ICD-10-CM

## 2025-05-30 DIAGNOSIS — I10 HYPERTENSION, UNSPECIFIED TYPE: ICD-10-CM

## 2025-05-30 DIAGNOSIS — I10 PRIMARY HYPERTENSION: ICD-10-CM

## 2025-05-30 DIAGNOSIS — N18.31 STAGE 3A CHRONIC KIDNEY DISEASE (MULTI): ICD-10-CM

## 2025-05-30 DIAGNOSIS — N18.30 HYPERTENSION ASSOCIATED WITH STAGE 3 CHRONIC KIDNEY DISEASE DUE TO TYPE 2 DIABETES MELLITUS: ICD-10-CM

## 2025-05-30 PROBLEM — E11.44 DIABETIC AMYOTROPHY ASSOCIATED WITH TYPE 2 DIABETES MELLITUS: Status: ACTIVE | Noted: 2025-04-15

## 2025-05-30 PROBLEM — G54.1 LUMBOSACRAL PLEXUS DISORDERS: Status: ACTIVE | Noted: 2025-04-15

## 2025-05-30 PROCEDURE — 1126F AMNT PAIN NOTED NONE PRSNT: CPT | Performed by: INTERNAL MEDICINE

## 2025-05-30 PROCEDURE — 4010F ACE/ARB THERAPY RXD/TAKEN: CPT | Performed by: INTERNAL MEDICINE

## 2025-05-30 PROCEDURE — 3074F SYST BP LT 130 MM HG: CPT | Performed by: INTERNAL MEDICINE

## 2025-05-30 PROCEDURE — 99214 OFFICE O/P EST MOD 30 MIN: CPT | Performed by: INTERNAL MEDICINE

## 2025-05-30 PROCEDURE — G2211 COMPLEX E/M VISIT ADD ON: HCPCS | Performed by: INTERNAL MEDICINE

## 2025-05-30 PROCEDURE — 1159F MED LIST DOCD IN RCRD: CPT | Performed by: INTERNAL MEDICINE

## 2025-05-30 PROCEDURE — 3008F BODY MASS INDEX DOCD: CPT | Performed by: INTERNAL MEDICINE

## 2025-05-30 PROCEDURE — 3078F DIAST BP <80 MM HG: CPT | Performed by: INTERNAL MEDICINE

## 2025-05-30 PROCEDURE — 1160F RVW MEDS BY RX/DR IN RCRD: CPT | Performed by: INTERNAL MEDICINE

## 2025-05-30 RX ORDER — VALSARTAN 320 MG/1
320 TABLET ORAL
Qty: 90 TABLET | Refills: 1 | Status: SHIPPED | OUTPATIENT
Start: 2025-05-30

## 2025-05-30 RX ORDER — AMLODIPINE BESYLATE 2.5 MG/1
2.5 TABLET ORAL DAILY
Qty: 90 TABLET | Refills: 1 | Status: SHIPPED | OUTPATIENT
Start: 2025-05-30

## 2025-05-30 ASSESSMENT — PAIN SCALES - GENERAL: PAINLEVEL_OUTOF10: 0-NO PAIN

## 2025-05-30 NOTE — PATIENT INSTRUCTIONS
If the Prevnar vaccine you had, was a Prevnar 13, it is recommended to get a Prevnar 20 at this point.  If it was a prevnar 15, you do not need anything additional at this time.    Call if the occasional lightheadedness on standing becomes more frequent than the once per week.    Non fasting blood test today or soon to check kidney function and magnesium.    Follow up annual wellness sept /October.    Fasting blood and urine test in the week or 2 before that appt, at least 48 hours before.  Instructions for obtaining lab tests:   For fasting blood tests:  Please do not consume calories for 10-12 hours prior to this blood test. It is ok to drink water, you should be hydrated.  Please do not take vitamins, supplements or thyroid medication before your blood is drawn this day.   Most lab results should be available for your review on the  My Chart portal within 48 hours. Please contact the office if you have not seen or been given results of these tests within 2 weeks of having them done.    Follow up for annual wellness and physical in fall, sept or October.    Same medications.

## 2025-06-05 LAB
ANION GAP SERPL CALCULATED.4IONS-SCNC: 10 MMOL/L (CALC) (ref 7–17)
BUN SERPL-MCNC: 18 MG/DL (ref 7–25)
BUN/CREAT SERPL: 18 (CALC) (ref 6–22)
CALCIUM SERPL-MCNC: 10.1 MG/DL (ref 8.6–10.4)
CHLORIDE SERPL-SCNC: 104 MMOL/L (ref 98–110)
CO2 SERPL-SCNC: 25 MMOL/L (ref 20–32)
CREAT SERPL-MCNC: 1.02 MG/DL (ref 0.6–1)
EGFRCR SERPLBLD CKD-EPI 2021: 58 ML/MIN/1.73M2
GLUCOSE SERPL-MCNC: 111 MG/DL (ref 65–99)
MAGNESIUM SERPL-MCNC: 1.6 MG/DL (ref 1.5–2.5)
POTASSIUM SERPL-SCNC: 4.9 MMOL/L (ref 3.5–5.3)
SODIUM SERPL-SCNC: 139 MMOL/L (ref 135–146)

## 2025-08-22 ENCOUNTER — OFFICE (OUTPATIENT)
Dept: URBAN - METROPOLITAN AREA CLINIC 27 | Facility: CLINIC | Age: 70
End: 2025-08-22
Payer: MEDICARE

## 2025-08-22 VITALS
HEIGHT: 63 IN | SYSTOLIC BLOOD PRESSURE: 150 MMHG | SYSTOLIC BLOOD PRESSURE: 167 MMHG | DIASTOLIC BLOOD PRESSURE: 70 MMHG | HEART RATE: 63 BPM | DIASTOLIC BLOOD PRESSURE: 73 MMHG | WEIGHT: 161 LBS

## 2025-08-22 DIAGNOSIS — K21.9 GASTRO-ESOPHAGEAL REFLUX DISEASE WITHOUT ESOPHAGITIS: ICD-10-CM

## 2025-08-22 DIAGNOSIS — K22.70 BARRETT'S ESOPHAGUS WITHOUT DYSPLASIA: ICD-10-CM

## 2025-08-22 DIAGNOSIS — R15.9 FULL INCONTINENCE OF FECES: ICD-10-CM

## 2025-08-22 DIAGNOSIS — K31.84 GASTROPARESIS: ICD-10-CM

## 2025-08-22 DIAGNOSIS — K58.9 IRRITABLE BOWEL SYNDROME, UNSPECIFIED: ICD-10-CM

## 2025-08-22 PROCEDURE — 99204 OFFICE O/P NEW MOD 45 MIN: CPT | Performed by: INTERNAL MEDICINE

## 2025-10-14 ENCOUNTER — APPOINTMENT (OUTPATIENT)
Dept: PRIMARY CARE | Facility: CLINIC | Age: 70
End: 2025-10-14
Payer: COMMERCIAL

## 2025-10-20 ENCOUNTER — APPOINTMENT (OUTPATIENT)
Dept: PRIMARY CARE | Facility: CLINIC | Age: 70
End: 2025-10-20
Payer: COMMERCIAL

## 2025-11-04 ENCOUNTER — APPOINTMENT (OUTPATIENT)
Dept: PRIMARY CARE | Facility: CLINIC | Age: 70
End: 2025-11-04
Payer: COMMERCIAL

## 2025-11-10 ENCOUNTER — APPOINTMENT (OUTPATIENT)
Dept: PRIMARY CARE | Facility: CLINIC | Age: 70
End: 2025-11-10
Payer: COMMERCIAL